# Patient Record
Sex: FEMALE | Race: WHITE | NOT HISPANIC OR LATINO | Employment: OTHER | URBAN - METROPOLITAN AREA
[De-identification: names, ages, dates, MRNs, and addresses within clinical notes are randomized per-mention and may not be internally consistent; named-entity substitution may affect disease eponyms.]

---

## 2023-08-31 PROBLEM — E04.2 NONTOXIC MULTINODULAR GOITER: Status: ACTIVE | Noted: 2023-08-31

## 2023-08-31 PROBLEM — M15.9 GENERALIZED OSTEOARTHRITIS: Status: ACTIVE | Noted: 2023-08-31

## 2023-08-31 PROBLEM — E03.9 ACQUIRED HYPOTHYROIDISM: Status: ACTIVE | Noted: 2023-08-31

## 2023-08-31 PROBLEM — E55.9 VITAMIN D DEFICIENCY: Status: ACTIVE | Noted: 2023-08-31

## 2023-08-31 PROBLEM — M54.50 LOW BACK PAIN AT MULTIPLE SITES: Status: ACTIVE | Noted: 2023-08-31

## 2023-08-31 PROBLEM — Z78.9 MEDICAL HOME PATIENT: Status: ACTIVE | Noted: 2023-08-31

## 2023-08-31 PROBLEM — I82.90 VENOUS THROMBOSIS: Status: ACTIVE | Noted: 2023-08-31

## 2023-08-31 PROBLEM — N95.9 MENOPAUSAL AND POSTMENOPAUSAL DISORDER: Status: ACTIVE | Noted: 2023-08-31

## 2023-08-31 PROBLEM — E06.3 AUTOIMMUNE THYROIDITIS: Status: ACTIVE | Noted: 2023-08-31

## 2023-08-31 PROBLEM — M19.90 OSTEOARTHRITIS: Status: ACTIVE | Noted: 2023-08-31

## 2023-08-31 PROBLEM — L71.9 ROSACEA: Status: ACTIVE | Noted: 2023-08-31

## 2023-08-31 PROBLEM — E66.9 OBESITY: Status: ACTIVE | Noted: 2023-08-31

## 2023-08-31 PROBLEM — M54.16 LUMBAR RADICULOPATHY, CHRONIC: Status: ACTIVE | Noted: 2023-08-31

## 2023-08-31 PROBLEM — I10 BENIGN HYPERTENSION: Status: ACTIVE | Noted: 2023-08-31

## 2023-08-31 RX ORDER — NAPROXEN SODIUM 220 MG/1
TABLET, FILM COATED ORAL
COMMUNITY
End: 2023-11-27 | Stop reason: ALTCHOICE

## 2023-08-31 RX ORDER — LISINOPRIL 20 MG/1
1 TABLET ORAL DAILY
COMMUNITY
End: 2024-04-15

## 2023-08-31 RX ORDER — CLOTRIMAZOLE AND BETAMETHASONE DIPROPIONATE 10; .5 MG/ML; MG/ML
LOTION TOPICAL EVERY 12 HOURS
COMMUNITY

## 2023-08-31 RX ORDER — ERGOCALCIFEROL 1.25 MG/1
CAPSULE ORAL
COMMUNITY
End: 2024-01-18 | Stop reason: SDUPTHER

## 2023-08-31 RX ORDER — METRONIDAZOLE 10 MG/G
1 GEL TOPICAL EVERY 24 HOURS
COMMUNITY
End: 2023-11-27 | Stop reason: ALTCHOICE

## 2023-08-31 RX ORDER — DEXTROMETHORPHAN HYDROBROMIDE, GUAIFENESIN 5; 100 MG/5ML; MG/5ML
LIQUID ORAL EVERY 8 HOURS
COMMUNITY
End: 2023-11-27 | Stop reason: ALTCHOICE

## 2023-08-31 RX ORDER — LEVOTHYROXINE SODIUM 75 UG/1
1 TABLET ORAL
COMMUNITY
End: 2023-10-24

## 2023-08-31 RX ORDER — HYDROCODONE BITARTRATE AND ACETAMINOPHEN 5; 325 MG/1; MG/1
TABLET ORAL
COMMUNITY
End: 2023-11-27 | Stop reason: ALTCHOICE

## 2023-10-24 DIAGNOSIS — E03.9 ACQUIRED HYPOTHYROIDISM: Primary | ICD-10-CM

## 2023-10-24 RX ORDER — LEVOTHYROXINE SODIUM 75 UG/1
75 TABLET ORAL
Qty: 90 TABLET | Refills: 3 | Status: SHIPPED | OUTPATIENT
Start: 2023-10-24

## 2023-11-27 ENCOUNTER — LAB (OUTPATIENT)
Dept: LAB | Facility: LAB | Age: 78
End: 2023-11-27
Payer: MEDICARE

## 2023-11-27 ENCOUNTER — OFFICE VISIT (OUTPATIENT)
Dept: PRIMARY CARE | Facility: CLINIC | Age: 78
End: 2023-11-27
Payer: MEDICARE

## 2023-11-27 VITALS
DIASTOLIC BLOOD PRESSURE: 78 MMHG | OXYGEN SATURATION: 99 % | WEIGHT: 184 LBS | SYSTOLIC BLOOD PRESSURE: 124 MMHG | HEIGHT: 62 IN | BODY MASS INDEX: 33.86 KG/M2 | HEART RATE: 69 BPM

## 2023-11-27 DIAGNOSIS — E55.9 VITAMIN D DEFICIENCY: ICD-10-CM

## 2023-11-27 DIAGNOSIS — R41.3 MEMORY LOSS: ICD-10-CM

## 2023-11-27 DIAGNOSIS — I82.90 VENOUS THROMBOSIS: ICD-10-CM

## 2023-11-27 DIAGNOSIS — L71.9 ROSACEA: ICD-10-CM

## 2023-11-27 DIAGNOSIS — M54.50 LOW BACK PAIN AT MULTIPLE SITES: ICD-10-CM

## 2023-11-27 DIAGNOSIS — R47.89 WORD FINDING DIFFICULTY: ICD-10-CM

## 2023-11-27 DIAGNOSIS — I10 BENIGN HYPERTENSION: Primary | ICD-10-CM

## 2023-11-27 DIAGNOSIS — E03.9 ACQUIRED HYPOTHYROIDISM: ICD-10-CM

## 2023-11-27 DIAGNOSIS — M54.16 LUMBAR RADICULOPATHY, CHRONIC: ICD-10-CM

## 2023-11-27 DIAGNOSIS — M15.9 GENERALIZED OSTEOARTHRITIS: ICD-10-CM

## 2023-11-27 DIAGNOSIS — Z12.11 SCREEN FOR COLON CANCER: ICD-10-CM

## 2023-11-27 PROBLEM — M19.90 OSTEOARTHRITIS: Status: RESOLVED | Noted: 2023-08-31 | Resolved: 2023-11-27

## 2023-11-27 PROBLEM — Z78.9 MEDICAL HOME PATIENT: Status: RESOLVED | Noted: 2023-08-31 | Resolved: 2023-11-27

## 2023-11-27 LAB — VIT B12 SERPL-MCNC: 974 PG/ML (ref 211–946)

## 2023-11-27 PROCEDURE — 82607 VITAMIN B-12: CPT

## 2023-11-27 PROCEDURE — 3074F SYST BP LT 130 MM HG: CPT | Performed by: INTERNAL MEDICINE

## 2023-11-27 PROCEDURE — 99214 OFFICE O/P EST MOD 30 MIN: CPT | Performed by: INTERNAL MEDICINE

## 2023-11-27 PROCEDURE — 36415 COLL VENOUS BLD VENIPUNCTURE: CPT

## 2023-11-27 PROCEDURE — 1036F TOBACCO NON-USER: CPT | Performed by: INTERNAL MEDICINE

## 2023-11-27 PROCEDURE — 1125F AMNT PAIN NOTED PAIN PRSNT: CPT | Performed by: INTERNAL MEDICINE

## 2023-11-27 PROCEDURE — 3078F DIAST BP <80 MM HG: CPT | Performed by: INTERNAL MEDICINE

## 2023-11-27 PROCEDURE — 1159F MED LIST DOCD IN RCRD: CPT | Performed by: INTERNAL MEDICINE

## 2023-11-27 PROCEDURE — 86780 TREPONEMA PALLIDUM: CPT

## 2023-11-27 PROCEDURE — 99214 OFFICE O/P EST MOD 30 MIN: CPT | Mod: 25 | Performed by: INTERNAL MEDICINE

## 2023-11-27 RX ORDER — ASPIRIN 325 MG
50000 TABLET, DELAYED RELEASE (ENTERIC COATED) ORAL
COMMUNITY
Start: 2015-07-17 | End: 2023-11-27 | Stop reason: SDUPTHER

## 2023-11-27 ASSESSMENT — ENCOUNTER SYMPTOMS
DEPRESSION: 0
DYSURIA: 0
OCCASIONAL FEELINGS OF UNSTEADINESS: 0
DIARRHEA: 0
ABDOMINAL PAIN: 0
SHORTNESS OF BREATH: 0
ACTIVITY CHANGE: 0
CARDIOVASCULAR NEGATIVE: 1
ARTHRALGIAS: 0
CONSTITUTIONAL NEGATIVE: 1
PSYCHIATRIC NEGATIVE: 1
COUGH: 0
CONSTIPATION: 0
LOSS OF SENSATION IN FEET: 0

## 2023-11-27 ASSESSMENT — PATIENT HEALTH QUESTIONNAIRE - PHQ9
1. LITTLE INTEREST OR PLEASURE IN DOING THINGS: NOT AT ALL
SUM OF ALL RESPONSES TO PHQ9 QUESTIONS 1 AND 2: 0
2. FEELING DOWN, DEPRESSED OR HOPELESS: NOT AT ALL

## 2023-11-27 ASSESSMENT — PAIN SCALES - GENERAL: PAINLEVEL: 4

## 2023-11-27 NOTE — PROGRESS NOTES
"Subjective   Patient ID: Nika Ricardo is a 78 y.o. female who presents for 4 MO FUV.    Hypertension-compliant and stable on current medications     Hypothyroid-stable on meds-last TSH   7/2023    Vitamin D def on supplements     Derm-did skin biopsy recently    Worried about dementia--\"losing words in conversation\", recipes conversions, got lost taking a different route. Denies harini car accidents or problems with money.  Positive h/o dementia in family: father, PGM (hardening of arteries in brain w/ CVA), mother-frontal-temporal dementia       Review of Systems   Constitutional: Negative.  Negative for activity change.   HENT:          Thinks ears w/ wax   Respiratory:  Negative for cough and shortness of breath.    Cardiovascular: Negative.    Gastrointestinal:  Negative for abdominal pain, constipation and diarrhea.        Due for cologuard   Genitourinary:  Negative for dysuria.   Musculoskeletal:  Negative for arthralgias.   Skin:  Negative for rash.   Psychiatric/Behavioral: Negative.         Objective   /78   Pulse 69   Ht 1.575 m (5' 2\")   Wt 83.5 kg (184 lb)   SpO2 99%   BMI 33.65 kg/m²     Physical Exam  Constitutional:       General: She is not in acute distress.     Appearance: Normal appearance.   HENT:      Ears:      Comments: Sm amt wax bilat w/o obstruction  Cardiovascular:      Rate and Rhythm: Normal rate and regular rhythm.      Heart sounds: Normal heart sounds. No murmur heard.     No gallop.   Pulmonary:      Breath sounds: Normal breath sounds. No wheezing, rhonchi or rales.   Skin:     General: Skin is warm and dry.      Findings: No rash.   Neurological:      General: No focal deficit present.      Mental Status: She is alert and oriented to person, place, and time.      Comments: 3/3 short term memory test   Psychiatric:         Mood and Affect: Mood normal.      Comments: Nervous about all this       Assessment/Plan   will start testing for her memory       Nika was seen " today for 4 mo fuv.  Diagnoses and all orders for this visit:  Benign hypertension (Primary)  Venous thrombosis  Acquired hypothyroidism  Vitamin D deficiency  Generalized osteoarthritis  Low back pain at multiple sites  Lumbar radiculopathy, chronic  Rosacea  Screen for colon cancer  -     Cologuard® colon cancer screening; Future  -     Cologuard® colon cancer screening  Memory loss  -     CT head wo IV contrast; Future  -     Vascular US carotid artery duplex bilateral; Future  -     Vitamin B12; Future  -     Syphilis Screen with Reflex; Future  Word finding difficulty  -     Vascular US carotid artery duplex bilateral; Future

## 2023-11-28 LAB — T PALLIDUM AB SER QL: NONREACTIVE

## 2023-12-07 ENCOUNTER — HOSPITAL ENCOUNTER (OUTPATIENT)
Dept: RADIOLOGY | Facility: HOSPITAL | Age: 78
Discharge: HOME | End: 2023-12-07
Payer: MEDICARE

## 2023-12-07 ENCOUNTER — CLINICAL SUPPORT (OUTPATIENT)
Dept: VASCULAR MEDICINE | Facility: CLINIC | Age: 78
End: 2023-12-07
Payer: MEDICARE

## 2023-12-07 DIAGNOSIS — R47.81 SLURRED SPEECH: ICD-10-CM

## 2023-12-07 DIAGNOSIS — R41.3 MEMORY LOSS: ICD-10-CM

## 2023-12-07 DIAGNOSIS — R47.89 WORD FINDING DIFFICULTY: ICD-10-CM

## 2023-12-07 PROCEDURE — 93880 EXTRACRANIAL BILAT STUDY: CPT

## 2023-12-07 PROCEDURE — 93880 EXTRACRANIAL BILAT STUDY: CPT | Performed by: INTERNAL MEDICINE

## 2023-12-07 PROCEDURE — 70450 CT HEAD/BRAIN W/O DYE: CPT

## 2023-12-13 LAB — NONINV COLON CA DNA+OCC BLD SCRN STL QL: NEGATIVE

## 2023-12-22 ENCOUNTER — OFFICE VISIT (OUTPATIENT)
Dept: PRIMARY CARE | Facility: CLINIC | Age: 78
End: 2023-12-22
Payer: MEDICARE

## 2023-12-22 VITALS
SYSTOLIC BLOOD PRESSURE: 120 MMHG | DIASTOLIC BLOOD PRESSURE: 74 MMHG | WEIGHT: 183 LBS | OXYGEN SATURATION: 98 % | BODY MASS INDEX: 33.68 KG/M2 | HEIGHT: 62 IN | HEART RATE: 77 BPM

## 2023-12-22 DIAGNOSIS — I67.89 CEREBRAL MICROVASCULAR DISEASE: Primary | ICD-10-CM

## 2023-12-22 DIAGNOSIS — I67.89 CEREBRAL MICROVASCULAR DISEASE: ICD-10-CM

## 2023-12-22 PROCEDURE — 1159F MED LIST DOCD IN RCRD: CPT | Performed by: INTERNAL MEDICINE

## 2023-12-22 PROCEDURE — 99214 OFFICE O/P EST MOD 30 MIN: CPT | Performed by: INTERNAL MEDICINE

## 2023-12-22 PROCEDURE — 3074F SYST BP LT 130 MM HG: CPT | Performed by: INTERNAL MEDICINE

## 2023-12-22 PROCEDURE — 3078F DIAST BP <80 MM HG: CPT | Performed by: INTERNAL MEDICINE

## 2023-12-22 PROCEDURE — 1125F AMNT PAIN NOTED PAIN PRSNT: CPT | Performed by: INTERNAL MEDICINE

## 2023-12-22 PROCEDURE — 1157F ADVNC CARE PLAN IN RCRD: CPT | Performed by: INTERNAL MEDICINE

## 2023-12-22 PROCEDURE — 1036F TOBACCO NON-USER: CPT | Performed by: INTERNAL MEDICINE

## 2023-12-22 RX ORDER — ATORVASTATIN CALCIUM 20 MG/1
20 TABLET, FILM COATED ORAL DAILY
Qty: 90 TABLET | OUTPATIENT
Start: 2023-12-22

## 2023-12-22 RX ORDER — ASPIRIN 81 MG/1
81 TABLET ORAL DAILY
Qty: 30 TABLET | Refills: 11 | Status: SHIPPED | OUTPATIENT
Start: 2023-12-22 | End: 2024-12-21

## 2023-12-22 RX ORDER — DICLOFENAC SODIUM 10 MG/G
4 GEL TOPICAL 4 TIMES DAILY PRN
COMMUNITY
End: 2023-12-22 | Stop reason: ALTCHOICE

## 2023-12-22 RX ORDER — ATORVASTATIN CALCIUM 20 MG/1
20 TABLET, FILM COATED ORAL DAILY
Qty: 30 TABLET | Refills: 11 | Status: SHIPPED | OUTPATIENT
Start: 2023-12-22 | End: 2023-12-22 | Stop reason: SDUPTHER

## 2023-12-22 ASSESSMENT — ENCOUNTER SYMPTOMS
DEPRESSION: 0
OCCASIONAL FEELINGS OF UNSTEADINESS: 0
LOSS OF SENSATION IN FEET: 0

## 2023-12-22 ASSESSMENT — PAIN SCALES - GENERAL: PAINLEVEL: 3

## 2023-12-22 NOTE — PROGRESS NOTES
"Subjective   Patient ID: Nika Ricardo is a 78 y.o. female who presents for ct results.    Here with     Reviewed nl labs, nl carotid US.  CT head with microvascular dz. 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  Discussed options--will try ASA and lipitor; ok to try otc brain builder such as prevegen and will re-eval             Objective   /74   Pulse 77   Ht 1.575 m (5' 2\")   Wt 83 kg (183 lb)   LMP  (LMP Unknown)   SpO2 98%   BMI 33.47 kg/m²         Assessment/Plan   Diagnoses and all orders for this visit:  Cerebral microvascular disease  -     atorvastatin (Lipitor) 20 mg tablet; Take 1 tablet (20 mg) by mouth once daily.  -     aspirin 81 mg EC tablet; Take 1 tablet (81 mg) by mouth once daily.  -     Lipid Panel; Future  Other orders  -     Follow Up In Primary Care - Established; Future         "

## 2024-01-02 ENCOUNTER — OFFICE VISIT (OUTPATIENT)
Dept: DERMATOLOGY | Facility: CLINIC | Age: 79
End: 2024-01-02
Payer: MEDICARE

## 2024-01-02 DIAGNOSIS — C44.01 BASAL CELL CARCINOMA (BCC) OF SKIN OF LIP: ICD-10-CM

## 2024-01-02 PROCEDURE — 17311 MOHS 1 STAGE H/N/HF/G: CPT | Performed by: DERMATOLOGY

## 2024-01-02 PROCEDURE — 1157F ADVNC CARE PLAN IN RCRD: CPT | Performed by: DERMATOLOGY

## 2024-01-02 PROCEDURE — 99204 OFFICE O/P NEW MOD 45 MIN: CPT | Performed by: DERMATOLOGY

## 2024-01-02 PROCEDURE — 17312 MOHS ADDL STAGE: CPT | Performed by: DERMATOLOGY

## 2024-01-02 PROCEDURE — 13131 CMPLX RPR F/C/C/M/N/AX/G/H/F: CPT | Performed by: DERMATOLOGY

## 2024-01-02 PROCEDURE — 1159F MED LIST DOCD IN RCRD: CPT | Performed by: DERMATOLOGY

## 2024-01-02 PROCEDURE — 1036F TOBACCO NON-USER: CPT | Performed by: DERMATOLOGY

## 2024-01-02 PROCEDURE — 1125F AMNT PAIN NOTED PAIN PRSNT: CPT | Performed by: DERMATOLOGY

## 2024-01-02 NOTE — PROGRESS NOTES
Mohs Surgery Operative Note    Date of Surgery:  1/2/2024  Surgeon:  Alyce Frankel MD  Office Location:  [address] [facility]  [facility]  [address]  [facility] [address]  Dept: [phone]  Dept Fax: [phone]  Referring Provider: Sari Hernandez MD  [address] [place] [address] [facility]  [address]  [place] [address]      Assessment/Plan   Pre-procedure:   Obtained informed consent: written from patient  The surgical site was identified and confirmed with the patient.     Intra-operative:   Audible time out called at : 8:57 AM 01/02/24  by: Dayana Ridley MA   Verified patient name, birthdate, site, specimen bottle label & requisition.    The planned procedure(s) was again reviewed with the patient. The risks of bleeding, infection, nerve damage and scarring were reviewed. Written authorization was obtained. The patient identity, surgical site, and planned procedure(s) were verified. The provider acted as both surgeon and pathologist.     Basal cell carcinoma (BCC) of skin of lip  Right Upper Cutaneous Lip  Mohs surgery  Consent obtained: written    Universal Protocol:  Procedure explained and questions answered to patient or proxy's satisfaction: Yes    Test results available and properly labeled: Yes    Pathology report reviewed: Yes    External notes reviewed: Yes    Photo or diagram used for site identification: Yes    Site/side marked: Yes    Slide independently reviewed by Mohs surgeon: Yes    Immediately prior to procedure a time out was called: Yes    Patient identity confirmed: verbally with patient  Preparation: Patient was prepped and draped in usual sterile fashion      Anticoagulation:  Is the patient taking prescription anticoagulant and/or aspirin prescribed/recommended by a physician? Yes    Was the anticoagulation regimen changed prior to Mohs? No      Anesthesia:  Anesthesia method: local infiltration  Local anesthetic: lidocaine 2% WITH  epi    Procedure Details:  Biopsy accession number: [id number]  Date of biopsy: 11/16/2023  Pre-Op diagnosis: basal cell carcinoma  BCC subtype: nodular and micronodular  Surgery side: right  Surgical site (from skin exam): Right Upper Cutaneous Lip  Pre-operative length (cm): 0.4  Pre-operative width (cm): 0.5  Indications for Mohs surgery: anatomic location where tissue conservation is critical  Previously treated? No      Micrographic Surgery Details:  Post-operative length (cm): 0.8  Post-operative width (cm): 0.8  Number of Mohs stages: 2    Stage 1     Comments: The patient was brought into the operating room and placed in the procedure chair in the appropriate position.  The area positive by previous biopsy was identified and confirmed with the patient. The area of clinically obvious tumor was debulked using a curette and/or scalpel as needed. An incision was made following the Mohs approach through the skin. The specimen was taken to the lab, divided into 2 piece(s) and appropriately chromacoded and processed.  -Tumor was seen on the lateral margins as indicated on the on the Mohs map.     Tumor features identified on Mohs section: basal carcinoma      Depth of invasion: dermis    Stage 2     Comments: The area of positivity as noted on the Mohs map in the previous stage was identified and removed using the Mohs technique. The specimen was taken to the lab and appropriately chromacoded and processed in 1 piece(s).     Tumor features identified on Mohs section: no tumor identified    Depth of defect: subcutaneous fat    Patient tolerance of procedure: tolerated well, no immediate complications    Reconstruction:  Was the defect reconstructed? Yes    Was reconstruction performed by the same Mohs surgeon? Yes    Setting of reconstruction: outpatient office  When was reconstruction performed? same day  Type of reconstruction: linear  Linear reconstruction: complex  Length of linear repair (cm):  1.8  Subcutaneous Layers (Deep Stitches)   Suture size:  5-0  Suture type:  Vicryl  Stitches:  Buried vertical mattress  Fine/surface layer approximation (top stitches)   Epidermal/Superficial suture size:  5-0  Epidermal/Superficial suture type:  Fast-absorbing gut  Stitches: simple running    Hemostasis achieved with: suture and pressure  Outcome: patient tolerated procedure well with no complications    Post-procedure details: wound care instructions given      Complex Linear Repair - Wide Undermining:  Given the location and size of the defect, it was determined that a complex layered linear closure was required to restore normal anatomy and function. The repair was considered complex because extensive undermining was required and performed. The amount of undermining performed was greater than the maximum width of the defect as measured perpendicular to the closure line along at least one entire edge of the defect. Standing cutaneous cones were removed using Burow's triangles. The wound edges were brought into close approximation with buried vertical mattress sutures. The remainder of the wound was then closed with epidermal top sutures.  The final repair measured 1.8 cm                Wound care was discussed, and the patient was given written post-operative wound care instructions.      The patient will follow up with Alyce Frankel MD as needed for any post operative problems or concerns, and will follow up with their primary dermatologist as scheduled.

## 2024-01-02 NOTE — LETTER
January 25, 2024     Sari Hernandez MD  [address]  [facility] [place] [id number]    Patient: Nika Ricardo   YOB: 1945   Date of Visit: 1/2/2024       Dear Dr. Sari Hernandez MD:    Thank you for referring Nika Ricardo to me for evaluation. Below are my notes for this consultation.  If you have questions, please do not hesitate to call me. I look forward to following your patient along with you.       Sincerely,     Alyce Frankel MD      CC: No Recipients  ______________________________________________________________________________________    Mohs Surgery Operative Note    Date of Surgery:  1/2/2024  Surgeon:  Alyce Frankel MD  Office Location: [address]  [facility] [address]  Dept: [phone]  Dept Fax: [phone]  Referring Provider: Sari Hernandez MD  [address] [place] [facility] [place] [facility] [place] [address]      Assessment/Plan  Pre-procedure:   Obtained informed consent: written from patient  The surgical site was identified and confirmed with the patient.     Intra-operative:   Audible time out called at : 8:57 AM 01/02/24  by: Dayana Ridley MA   Verified patient name, birthdate, site, specimen bottle label & requisition.    The planned procedure(s) was again reviewed with the patient. The risks of bleeding, infection, nerve damage and scarring were reviewed. Written authorization was obtained. The patient identity, surgical site, and planned procedure(s) were verified. The provider acted as both surgeon and pathologist.     Basal cell carcinoma (BCC) of skin of lip  Right Upper Cutaneous Lip  Mohs surgery  Consent obtained: written    Universal Protocol:  Procedure explained and questions answered to patient or proxy's satisfaction: Yes    Test results available and properly labeled: Yes    Pathology report reviewed: Yes    External notes  reviewed: Yes    Photo or diagram used for site identification: Yes    Site/side marked: Yes    Slide independently reviewed by Mohs surgeon: Yes    Immediately prior to procedure a time out was called: Yes    Patient identity confirmed: verbally with patient  Preparation: Patient was prepped and draped in usual sterile fashion      Anticoagulation:  Is the patient taking prescription anticoagulant and/or aspirin prescribed/recommended by a physician? Yes    Was the anticoagulation regimen changed prior to Mohs? No      Anesthesia:  Anesthesia method: local infiltration  Local anesthetic: lidocaine 2% WITH epi    Procedure Details:  Biopsy accession number: [id number]  Date of biopsy: 11/16/2023  Pre-Op diagnosis: basal cell carcinoma  BCC subtype: nodular and micronodular  Surgery side: right  Surgical site (from skin exam): Right Upper Cutaneous Lip  Pre-operative length (cm): 0.4  Pre-operative width (cm): 0.5  Indications for Mohs surgery: anatomic location where tissue conservation is critical  Previously treated? No      Micrographic Surgery Details:  Post-operative length (cm): 0.8  Post-operative width (cm): 0.8  Number of Mohs stages: 2    Stage 1     Comments: The patient was brought into the operating room and placed in the procedure chair in the appropriate position.  The area positive by previous biopsy was identified and confirmed with the patient. The area of clinically obvious tumor was debulked using a curette and/or scalpel as needed. An incision was made following the Mohs approach through the skin. The specimen was taken to the lab, divided into 2 piece(s) and appropriately chromacoded and processed.  -Tumor was seen on the lateral margins as indicated on the on the Mohs map.     Tumor features identified on Mohs section: basal carcinoma      Depth of invasion: dermis    Stage 2     Comments: The area of positivity as noted on the Mohs map in the previous stage was identified and removed  using the Mohs technique. The specimen was taken to the lab and appropriately chromacoded and processed in 1 piece(s).     Tumor features identified on Mohs section: no tumor identified    Depth of defect: subcutaneous fat    Patient tolerance of procedure: tolerated well, no immediate complications    Reconstruction:  Was the defect reconstructed? Yes    Was reconstruction performed by the same Mohs surgeon? Yes    Setting of reconstruction: outpatient office  When was reconstruction performed? same day  Type of reconstruction: linear  Linear reconstruction: complex  Length of linear repair (cm): 1.8  Subcutaneous Layers (Deep Stitches)   Suture size:  5-0  Suture type:  Vicryl  Stitches:  Buried vertical mattress  Fine/surface layer approximation (top stitches)   Epidermal/Superficial suture size:  5-0  Epidermal/Superficial suture type:  Fast-absorbing gut  Stitches: simple running    Hemostasis achieved with: suture and pressure  Outcome: patient tolerated procedure well with no complications    Post-procedure details: wound care instructions given      Complex Linear Repair - Wide Undermining:  Given the location and size of the defect, it was determined that a complex layered linear closure was required to restore normal anatomy and function. The repair was considered complex because extensive undermining was required and performed. The amount of undermining performed was greater than the maximum width of the defect as measured perpendicular to the closure line along at least one entire edge of the defect. Standing cutaneous cones were removed using Burow's triangles. The wound edges were brought into close approximation with buried vertical mattress sutures. The remainder of the wound was then closed with epidermal top sutures.  The final repair measured 1.8 cm                Wound care was discussed, and the patient was given written post-operative wound care instructions.      The patient will follow up  with Alyce Frankel MD as needed for any post operative problems or concerns, and will follow up with their primary dermatologist as scheduled.        Office Visit Note  Date: 1/2/2024  Surgeon:  Alyce Frankel MD  Office Location:  [address] [facility]  [address]  [address]  [facility] [address]  Dept: [phone]  Dept Fax: [phone]  Referring Provider: Sari Hernandez MD  [address] [place] [address] [facility]  [address]  [place] [address]    Nishant Ricardo is a 78 y.o. female who presents for the following: MOHS Surgery (Right Upper Cutaneous Lip - )    According to the patient, the lesion has been presnt for approximately 6 months at the time of diagnosis.  The lesion is not causing symptoms.  The lesion has not been treated previously.    The patient does not have a pacemaker / defibrillator.  The patient does have a heart valve / joint replacement.    The patient is on blood thinners.  The patient does not have a history of hepatitis B or C.  The patient does not have a history of HIV.  The patient does not have a history of immunosuppression (e.g. organ transplantation, malignancy, medications)    Review of Systems:  No other skin or systemic complaints other than what is documented elsewhere in the note.    MEDICAL HISTORY: clinically relevant history including significant past medical history, medications and allergies was reviewed and documented in Epic.    Objective  Well appearing patient in no apparent distress; mood and affect are within normal limits.  Vital signs: See record.  Noted on the Right Upper Cutaneous Lip  Is a 0.4 x 0.5 cm scar    The patient confirmed the identified site.    Discussion:  The nature of the diagnosis was explained. The lesion is a skin cancer.  It has a risk of local growth and distant spread. The condition is associated with sun exposure.  Warning signs of non-melanoma skin cancer discussed. Patient was  instructed to perform monthly self skin examination.  We recommended that the patient have regular full skin exams given an increased risk of subsequent skin cancers. The patient was instructed to use sun protective behaviors including use of broad spectrum sunscreens and sun protective clothing to reduce risk of skin cancers.      Risks, benefits, side effects of Mohs surgery were discussed with patient and the patient voiced understanding.  It was explained that even though the cure rate of Mohs is very high it is not 100%. Risks of surgery including but not limited to bleeding, infection, numbness, nerve damage, and scar were reviewed.  Discussion included wound care requirements, activity restrictions, likely scar outcome and time to heal.     After Mohs surgery, the defect may need to be repaired surgically and the scar may be longer than the original lesion.  Reconstruction options, risks, and benefits were reviewed including second intention healing, linear repair (4-1 ratio was explained), local flaps, skin grafts, cartilage grafts and interpolation flaps (the need for multiple surgeries was explained). Possible outcomes were reviewed including likely scar appearance, failure of flap survival, infection, bleeding and the need for revision surgery.     The pathology was reviewed, the photograph was reviewed, and the referring physician's note was reviewed.    Patient elected for Mohs surgery.

## 2024-01-02 NOTE — PROGRESS NOTES
Office Visit Note  Date: 1/2/2024  Surgeon:  Alyce Frankel MD  Office Location: DO [address] [facility]  [address]  [address]  [facility] [address]  Dept: [phone]  Dept Fax: [phone]  Referring Provider: Sari Hernandez MD  [address] [place] [address] [facility]  [address]  [place] [address]    Nishant Ricardo is a 78 y.o. female who presents for the following: MOHS Surgery (Right Upper Cutaneous Lip - )    According to the patient, the lesion has been presnt for approximately 6 months at the time of diagnosis.  The lesion is not causing symptoms.  The lesion has not been treated previously.    The patient does not have a pacemaker / defibrillator.  The patient does have a heart valve / joint replacement.    The patient is on blood thinners.  The patient does not have a history of hepatitis B or C.  The patient does not have a history of HIV.  The patient does not have a history of immunosuppression (e.g. organ transplantation, malignancy, medications)    Review of Systems:  No other skin or systemic complaints other than what is documented elsewhere in the note.    MEDICAL HISTORY: clinically relevant history including significant past medical history, medications and allergies was reviewed and documented in Epic.    Objective   Well appearing patient in no apparent distress; mood and affect are within normal limits.  Vital signs: See record.  Noted on the Right Upper Cutaneous Lip  Is a 0.4 x 0.5 cm scar    The patient confirmed the identified site.    Discussion:  The nature of the diagnosis was explained. The lesion is a skin cancer.  It has a risk of local growth and distant spread. The condition is associated with sun exposure.  Warning signs of non-melanoma skin cancer discussed. Patient was instructed to perform monthly self skin examination.  We recommended that the patient have regular full skin exams given an increased risk of subsequent  skin cancers. The patient was instructed to use sun protective behaviors including use of broad spectrum sunscreens and sun protective clothing to reduce risk of skin cancers.      Risks, benefits, side effects of Mohs surgery were discussed with patient and the patient voiced understanding.  It was explained that even though the cure rate of Mohs is very high it is not 100%. Risks of surgery including but not limited to bleeding, infection, numbness, nerve damage, and scar were reviewed.  Discussion included wound care requirements, activity restrictions, likely scar outcome and time to heal.     After Mohs surgery, the defect may need to be repaired surgically and the scar may be longer than the original lesion.  Reconstruction options, risks, and benefits were reviewed including second intention healing, linear repair (4-1 ratio was explained), local flaps, skin grafts, cartilage grafts and interpolation flaps (the need for multiple surgeries was explained). Possible outcomes were reviewed including likely scar appearance, failure of flap survival, infection, bleeding and the need for revision surgery.     The pathology was reviewed, the photograph was reviewed, and the referring physician's note was reviewed.    Patient elected for Mohs surgery.

## 2024-01-09 ENCOUNTER — TELEPHONE (OUTPATIENT)
Dept: DERMATOLOGY | Facility: CLINIC | Age: 79
End: 2024-01-09
Payer: MEDICARE

## 2024-01-09 NOTE — TELEPHONE ENCOUNTER
Pt called stating that she still has clear (top) sutures on her surgical site form 1/2/24 with Dr. Frankel on her upper lip. Pt informed that she can lightly pull at the clear suture to help it come off, pt was hesitant with this option. Pt advised to continue to wash, Vaseline and bandage the area for 3 more days to help the suture dissolve, she can call our office Friday or Monday if the stitches are still present.

## 2024-01-15 ENCOUNTER — TELEPHONE (OUTPATIENT)
Dept: DERMATOLOGY | Facility: CLINIC | Age: 79
End: 2024-01-15
Payer: MEDICARE

## 2024-01-15 NOTE — TELEPHONE ENCOUNTER
Patient called with concerns of bumps along stitch line that are not painful or red. She is using the vaseline and would like the bumps checked out by Dr. Frankel. Nika will come in tomorrow to be assessed.

## 2024-01-16 ENCOUNTER — OFFICE VISIT (OUTPATIENT)
Dept: DERMATOLOGY | Facility: CLINIC | Age: 79
End: 2024-01-16
Payer: MEDICARE

## 2024-01-16 DIAGNOSIS — Z51.89 VISIT FOR WOUND CHECK: Primary | ICD-10-CM

## 2024-01-16 PROCEDURE — 1159F MED LIST DOCD IN RCRD: CPT | Performed by: DERMATOLOGY

## 2024-01-16 PROCEDURE — 1125F AMNT PAIN NOTED PAIN PRSNT: CPT | Performed by: DERMATOLOGY

## 2024-01-16 PROCEDURE — 1036F TOBACCO NON-USER: CPT | Performed by: DERMATOLOGY

## 2024-01-16 PROCEDURE — 99024 POSTOP FOLLOW-UP VISIT: CPT | Performed by: DERMATOLOGY

## 2024-01-16 NOTE — PROGRESS NOTES
Office Follow Up Note    Visit Summary  Chief Complaint    1. Complaint Wound check.    Nika Ricardo is a 78 y.o. female who presents for 2 week follow up after surgery for a basal cell carcinoma. The patient has concerns of bumpiness    Location Operation site location: Right Upper Cutaneous Lip    On exam,  Ms. Ricardo is well-appearing and in no apparent distress. The surgical site appears clean with minimal to no erythema. No tenderness and good wound edge apposition.    Assessment and Plan:  Discussed normal scar healing  History of skin cancer requiring ongoing monitoring for recurrence and additional lesion development.   The patient was reassured that the wound is healing appropriately.   Okay to start gentle massage    The patient was advised on the importance of sun protection and routine skin monitoring and instructed to call with any further concerns. The patient will return as needed.    Alyce Frankel MD

## 2024-01-18 ENCOUNTER — TELEPHONE (OUTPATIENT)
Dept: PRIMARY CARE | Facility: CLINIC | Age: 79
End: 2024-01-18
Payer: MEDICARE

## 2024-01-18 DIAGNOSIS — E55.9 VITAMIN D DEFICIENCY: ICD-10-CM

## 2024-01-18 RX ORDER — ERGOCALCIFEROL 1.25 MG/1
CAPSULE ORAL
Qty: 6 CAPSULE | Refills: 3 | Status: SHIPPED | OUTPATIENT
Start: 2024-01-18

## 2024-02-20 ENCOUNTER — OFFICE VISIT (OUTPATIENT)
Dept: PRIMARY CARE | Facility: CLINIC | Age: 79
End: 2024-02-20
Payer: MEDICARE

## 2024-02-20 VITALS
BODY MASS INDEX: 34.04 KG/M2 | DIASTOLIC BLOOD PRESSURE: 72 MMHG | WEIGHT: 185 LBS | SYSTOLIC BLOOD PRESSURE: 116 MMHG | HEIGHT: 62 IN

## 2024-02-20 DIAGNOSIS — H61.23 IMPACTED CERUMEN, BILATERAL: Primary | ICD-10-CM

## 2024-02-20 PROBLEM — K59.00 CONSTIPATION, UNSPECIFIED: Status: ACTIVE | Noted: 2024-02-20

## 2024-02-20 PROBLEM — M16.11 ARTHRITIS OF RIGHT HIP: Status: ACTIVE | Noted: 2018-12-27

## 2024-02-20 PROBLEM — R47.89 WORD FINDING DIFFICULTY: Status: ACTIVE | Noted: 2024-02-20

## 2024-02-20 PROBLEM — Z78.9 MEDICAL HOME PATIENT: Status: ACTIVE | Noted: 2024-02-20

## 2024-02-20 PROBLEM — M25.551 PAIN IN RIGHT HIP: Status: ACTIVE | Noted: 2024-02-20

## 2024-02-20 PROBLEM — M18.0 PRIMARY OSTEOARTHRITIS OF BOTH FIRST CARPOMETACARPAL JOINTS: Status: ACTIVE | Noted: 2017-01-24

## 2024-02-20 PROBLEM — M43.10 DEGENERATIVE SPONDYLOLISTHESIS: Status: ACTIVE | Noted: 2018-10-19

## 2024-02-20 PROBLEM — L72.0 EPIDERMAL CYST: Status: ACTIVE | Noted: 2022-01-18

## 2024-02-20 PROBLEM — R41.3 AMNESIA: Status: ACTIVE | Noted: 2023-11-27

## 2024-02-20 PROCEDURE — 1126F AMNT PAIN NOTED NONE PRSNT: CPT | Performed by: INTERNAL MEDICINE

## 2024-02-20 PROCEDURE — 3078F DIAST BP <80 MM HG: CPT | Performed by: INTERNAL MEDICINE

## 2024-02-20 PROCEDURE — 99212 OFFICE O/P EST SF 10 MIN: CPT | Performed by: INTERNAL MEDICINE

## 2024-02-20 PROCEDURE — 1159F MED LIST DOCD IN RCRD: CPT | Performed by: INTERNAL MEDICINE

## 2024-02-20 PROCEDURE — 1158F ADVNC CARE PLAN TLK DOCD: CPT | Performed by: INTERNAL MEDICINE

## 2024-02-20 PROCEDURE — 1123F ACP DISCUSS/DSCN MKR DOCD: CPT | Performed by: INTERNAL MEDICINE

## 2024-02-20 PROCEDURE — 3074F SYST BP LT 130 MM HG: CPT | Performed by: INTERNAL MEDICINE

## 2024-02-20 PROCEDURE — 1157F ADVNC CARE PLAN IN RCRD: CPT | Performed by: INTERNAL MEDICINE

## 2024-02-20 PROCEDURE — 1036F TOBACCO NON-USER: CPT | Performed by: INTERNAL MEDICINE

## 2024-02-20 ASSESSMENT — PAIN SCALES - GENERAL: PAINLEVEL: 0-NO PAIN

## 2024-02-20 ASSESSMENT — PATIENT HEALTH QUESTIONNAIRE - PHQ9
SUM OF ALL RESPONSES TO PHQ9 QUESTIONS 1 AND 2: 0
2. FEELING DOWN, DEPRESSED OR HOPELESS: NOT AT ALL
1. LITTLE INTEREST OR PLEASURE IN DOING THINGS: NOT AT ALL

## 2024-02-20 ASSESSMENT — ENCOUNTER SYMPTOMS
OCCASIONAL FEELINGS OF UNSTEADINESS: 0
DEPRESSION: 0
LOSS OF SENSATION IN FEET: 0

## 2024-02-20 NOTE — PROGRESS NOTES
"Subjective   Patient ID: Nika Ricardo is a 78 y.o. female who presents for blocked ears.    Ears blocked again-had been doing drops 1-2x/week but though was doing ok so backed off to every other week or so. Last 2 weeks can't hear on R and intermittent on left. No pain or drainage             Objective   /72   Ht 1.575 m (5' 2\")   Wt 83.9 kg (185 lb)   LMP  (LMP Unknown)   BMI 33.84 kg/m²     Physical Exam  HENT:      Right Ear: There is impacted cerumen.      Left Ear: There is impacted cerumen.         Assessment/Plan   Diagnoses and all orders for this visit:  Impacted cerumen, bilateral  -     Ear cerumen removal    Instructed by nurse in use of wax drops after successfully clearing wax     "

## 2024-03-22 ENCOUNTER — OFFICE VISIT (OUTPATIENT)
Dept: PRIMARY CARE | Facility: CLINIC | Age: 79
End: 2024-03-22
Payer: MEDICARE

## 2024-03-22 ENCOUNTER — LAB (OUTPATIENT)
Dept: LAB | Facility: LAB | Age: 79
End: 2024-03-22
Payer: MEDICARE

## 2024-03-22 VITALS
OXYGEN SATURATION: 97 % | BODY MASS INDEX: 33.49 KG/M2 | DIASTOLIC BLOOD PRESSURE: 76 MMHG | HEIGHT: 62 IN | WEIGHT: 182 LBS | HEART RATE: 71 BPM | SYSTOLIC BLOOD PRESSURE: 124 MMHG

## 2024-03-22 DIAGNOSIS — M54.16 LUMBAR RADICULOPATHY, CHRONIC: ICD-10-CM

## 2024-03-22 DIAGNOSIS — H61.21 RIGHT EAR IMPACTED CERUMEN: ICD-10-CM

## 2024-03-22 DIAGNOSIS — E04.2 NONTOXIC MULTINODULAR GOITER: ICD-10-CM

## 2024-03-22 DIAGNOSIS — E06.3 AUTOIMMUNE THYROIDITIS: ICD-10-CM

## 2024-03-22 DIAGNOSIS — I67.89 CEREBRAL MICROVASCULAR DISEASE: ICD-10-CM

## 2024-03-22 DIAGNOSIS — M54.50 LOW BACK PAIN AT MULTIPLE SITES: ICD-10-CM

## 2024-03-22 DIAGNOSIS — I67.89 CEREBRAL MICROVASCULAR DISEASE: Primary | ICD-10-CM

## 2024-03-22 DIAGNOSIS — M15.9 GENERALIZED OSTEOARTHRITIS: ICD-10-CM

## 2024-03-22 DIAGNOSIS — E03.8 HYPOTHYROIDISM DUE TO HASHIMOTO'S THYROIDITIS: ICD-10-CM

## 2024-03-22 DIAGNOSIS — I10 BENIGN HYPERTENSION: ICD-10-CM

## 2024-03-22 DIAGNOSIS — E55.9 VITAMIN D DEFICIENCY: ICD-10-CM

## 2024-03-22 DIAGNOSIS — E66.09 CLASS 1 OBESITY DUE TO EXCESS CALORIES WITH SERIOUS COMORBIDITY AND BODY MASS INDEX (BMI) OF 33.0 TO 33.9 IN ADULT: ICD-10-CM

## 2024-03-22 DIAGNOSIS — E06.3 HYPOTHYROIDISM DUE TO HASHIMOTO'S THYROIDITIS: ICD-10-CM

## 2024-03-22 PROBLEM — C44.01 BASAL CELL CARCINOMA (BCC) OF SKIN OF LIP: Status: ACTIVE | Noted: 2024-03-22

## 2024-03-22 LAB
CHOLEST SERPL-MCNC: 140 MG/DL (ref 133–200)
CHOLEST/HDLC SERPL: 2 {RATIO}
HDLC SERPL-MCNC: 70 MG/DL
LDLC SERPL CALC-MCNC: 58 MG/DL (ref 65–130)
TRIGL SERPL-MCNC: 60 MG/DL (ref 40–150)

## 2024-03-22 PROCEDURE — 1159F MED LIST DOCD IN RCRD: CPT | Performed by: INTERNAL MEDICINE

## 2024-03-22 PROCEDURE — 1036F TOBACCO NON-USER: CPT | Performed by: INTERNAL MEDICINE

## 2024-03-22 PROCEDURE — 99214 OFFICE O/P EST MOD 30 MIN: CPT | Performed by: INTERNAL MEDICINE

## 2024-03-22 PROCEDURE — 69210 REMOVE IMPACTED EAR WAX UNI: CPT | Performed by: INTERNAL MEDICINE

## 2024-03-22 PROCEDURE — 36415 COLL VENOUS BLD VENIPUNCTURE: CPT

## 2024-03-22 PROCEDURE — 1125F AMNT PAIN NOTED PAIN PRSNT: CPT | Performed by: INTERNAL MEDICINE

## 2024-03-22 PROCEDURE — 3078F DIAST BP <80 MM HG: CPT | Performed by: INTERNAL MEDICINE

## 2024-03-22 PROCEDURE — 1123F ACP DISCUSS/DSCN MKR DOCD: CPT | Performed by: INTERNAL MEDICINE

## 2024-03-22 PROCEDURE — 3074F SYST BP LT 130 MM HG: CPT | Performed by: INTERNAL MEDICINE

## 2024-03-22 PROCEDURE — 80061 LIPID PANEL: CPT

## 2024-03-22 PROCEDURE — 1157F ADVNC CARE PLAN IN RCRD: CPT | Performed by: INTERNAL MEDICINE

## 2024-03-22 ASSESSMENT — ENCOUNTER SYMPTOMS
COUGH: 0
CONSTIPATION: 0
PSYCHIATRIC NEGATIVE: 1
DIARRHEA: 0
ABDOMINAL PAIN: 0
OCCASIONAL FEELINGS OF UNSTEADINESS: 0
DYSURIA: 0
LOSS OF SENSATION IN FEET: 0
ACTIVITY CHANGE: 0
CONSTITUTIONAL NEGATIVE: 1
ARTHRALGIAS: 0
SHORTNESS OF BREATH: 0
CARDIOVASCULAR NEGATIVE: 1
DEPRESSION: 0

## 2024-03-22 ASSESSMENT — PAIN SCALES - GENERAL: PAINLEVEL: 3

## 2024-03-22 NOTE — PROGRESS NOTES
"Subjective   Patient ID: Nika Ricardo is a 78 y.o. female who presents for Follow-up.    Cerebral microvascular disease on CT scan with mild cognitive impairment; family history of dementia; currently trying prevagen    Hypertension-compliant and stable on current medications     Hyperlipidemia-stable and compliant on meds. Taking atorvastatin-tolerating it so far. Lab Results       Component                Value               Date                       LDLCALC                  58 (L)              03/22/2024               Hypothyroid with thyroiditis and goiter-stable on meds-last TSH   7/2023    Vitamin D def on supplements              Review of Systems   Constitutional: Negative.  Negative for activity change.   Respiratory:  Negative for cough and shortness of breath.    Cardiovascular: Negative.    Gastrointestinal:  Negative for abdominal pain, constipation and diarrhea.        Cologuard negative   Genitourinary:  Negative for dysuria.   Musculoskeletal:  Negative for arthralgias.   Skin:  Negative for rash.   Neurological:         Word finding difficulty persists   Psychiatric/Behavioral: Negative.         Objective   /76   Pulse 71   Ht 1.575 m (5' 2\")   Wt 82.6 kg (182 lb)   LMP  (LMP Unknown)   SpO2 97%   BMI 33.29 kg/m²     Physical Exam  Constitutional:       General: She is not in acute distress.     Appearance: Normal appearance.   HENT:      Right Ear: There is impacted cerumen.      Left Ear: Tympanic membrane normal.   Cardiovascular:      Rate and Rhythm: Normal rate and regular rhythm.      Heart sounds: Normal heart sounds. No murmur heard.     No gallop.   Pulmonary:      Breath sounds: Normal breath sounds. No wheezing, rhonchi or rales.   Skin:     General: Skin is warm and dry.      Findings: No rash.   Neurological:      General: No focal deficit present.      Mental Status: She is alert and oriented to person, place, and time.      Comments: 3/3 short term memory test "   Psychiatric:         Mood and Affect: Mood normal.      Comments: Nervous about all this         Assessment/Plan   Diagnoses and all orders for this visit:  Cerebral microvascular disease  Benign hypertension  Autoimmune thyroiditis  Hypothyroidism due to Hashimoto's thyroiditis  Nontoxic multinodular goiter  Class 1 obesity due to excess calories with serious comorbidity and body mass index (BMI) of 33.0 to 33.9 in adult  Vitamin D deficiency  Generalized osteoarthritis  Low back pain at multiple sites  Lumbar radiculopathy, chronic  Right ear impacted cerumen  -     Ear Cerumen Removal  Other orders  -     Follow Up In Primary Care - Established; Future

## 2024-04-13 DIAGNOSIS — I10 BENIGN HYPERTENSION: Primary | ICD-10-CM

## 2024-04-15 RX ORDER — LISINOPRIL 20 MG/1
20 TABLET ORAL DAILY
Qty: 90 TABLET | Refills: 3 | Status: SHIPPED | OUTPATIENT
Start: 2024-04-15

## 2024-05-03 ENCOUNTER — OFFICE VISIT (OUTPATIENT)
Dept: PRIMARY CARE | Facility: CLINIC | Age: 79
End: 2024-05-03
Payer: MEDICARE

## 2024-05-03 VITALS
DIASTOLIC BLOOD PRESSURE: 64 MMHG | TEMPERATURE: 97.9 F | WEIGHT: 182.7 LBS | OXYGEN SATURATION: 97 % | BODY MASS INDEX: 33.42 KG/M2 | SYSTOLIC BLOOD PRESSURE: 120 MMHG | HEART RATE: 72 BPM

## 2024-05-03 DIAGNOSIS — N39.0 ACUTE UTI: Primary | ICD-10-CM

## 2024-05-03 LAB
POC APPEARANCE, URINE: CLEAR
POC BILIRUBIN, URINE: NEGATIVE
POC BLOOD, URINE: ABNORMAL
POC COLOR, URINE: ABNORMAL
POC GLUCOSE, URINE: NEGATIVE MG/DL
POC KETONES, URINE: ABNORMAL MG/DL
POC LEUKOCYTES, URINE: ABNORMAL
POC NITRITE,URINE: NEGATIVE
POC PH, URINE: 6 PH
POC PROTEIN, URINE: NEGATIVE MG/DL
POC SPECIFIC GRAVITY, URINE: 1.02
POC UROBILINOGEN, URINE: 1 EU/DL

## 2024-05-03 PROCEDURE — 1123F ACP DISCUSS/DSCN MKR DOCD: CPT | Performed by: INTERNAL MEDICINE

## 2024-05-03 PROCEDURE — 1036F TOBACCO NON-USER: CPT | Performed by: INTERNAL MEDICINE

## 2024-05-03 PROCEDURE — 1157F ADVNC CARE PLAN IN RCRD: CPT | Performed by: INTERNAL MEDICINE

## 2024-05-03 PROCEDURE — 1125F AMNT PAIN NOTED PAIN PRSNT: CPT | Performed by: INTERNAL MEDICINE

## 2024-05-03 PROCEDURE — 1159F MED LIST DOCD IN RCRD: CPT | Performed by: INTERNAL MEDICINE

## 2024-05-03 PROCEDURE — 99213 OFFICE O/P EST LOW 20 MIN: CPT | Performed by: INTERNAL MEDICINE

## 2024-05-03 PROCEDURE — 81003 URINALYSIS AUTO W/O SCOPE: CPT | Performed by: INTERNAL MEDICINE

## 2024-05-03 PROCEDURE — 3078F DIAST BP <80 MM HG: CPT | Performed by: INTERNAL MEDICINE

## 2024-05-03 PROCEDURE — 3074F SYST BP LT 130 MM HG: CPT | Performed by: INTERNAL MEDICINE

## 2024-05-03 RX ORDER — SULFAMETHOXAZOLE AND TRIMETHOPRIM 800; 160 MG/1; MG/1
1 TABLET ORAL 2 TIMES DAILY
Qty: 6 TABLET | Refills: 0 | Status: SHIPPED | OUTPATIENT
Start: 2024-05-03 | End: 2024-05-06

## 2024-05-03 ASSESSMENT — ENCOUNTER SYMPTOMS
FREQUENCY: 1
DEPRESSION: 0
HEMATURIA: 0
OCCASIONAL FEELINGS OF UNSTEADINESS: 0
LOSS OF SENSATION IN FEET: 0
CHILLS: 0

## 2024-05-03 ASSESSMENT — PATIENT HEALTH QUESTIONNAIRE - PHQ9
2. FEELING DOWN, DEPRESSED OR HOPELESS: NOT AT ALL
SUM OF ALL RESPONSES TO PHQ9 QUESTIONS 1 AND 2: 0
1. LITTLE INTEREST OR PLEASURE IN DOING THINGS: NOT AT ALL

## 2024-05-03 ASSESSMENT — PAIN SCALES - GENERAL: PAINLEVEL: 5

## 2024-05-03 NOTE — PROGRESS NOTES
Subjective   Patient ID: Nika Ricardo is a 78 y.o. female who presents for UTI.    UTI   This is a new problem. The current episode started today. The problem occurs every urination. The problem has been gradually worsening. The quality of the pain is described as burning. The pain is mild. There has been no fever. Associated symptoms include frequency. Pertinent negatives include no chills, hematuria or hesitancy. She has tried increased fluids for the symptoms. The treatment provided no relief. Her past medical history is significant for recurrent UTIs.        Review of Systems   Constitutional:  Negative for chills.   Genitourinary:  Positive for frequency. Negative for hematuria and hesitancy.       Objective   /64 (BP Location: Left arm, Patient Position: Sitting)   Pulse 72   Temp 36.6 °C (97.9 °F)   Wt 82.9 kg (182 lb 11.2 oz)   LMP  (LMP Unknown)   SpO2 97%   BMI 33.42 kg/m²     Physical Exam  Constitutional:       General: She is not in acute distress.     Appearance: She is not ill-appearing.         Assessment/Plan  advised to call if doesn't resolve  Diagnoses and all orders for this visit:  Acute UTI  -     POCT UA Automated manually resulted  -     sulfamethoxazole-trimethoprim (Bactrim DS) 800-160 mg tablet; Take 1 tablet by mouth 2 times a day for 3 days.

## 2024-06-09 ENCOUNTER — LAB REQUISITION (OUTPATIENT)
Dept: LAB | Facility: HOSPITAL | Age: 79
End: 2024-06-09
Payer: MEDICARE

## 2024-06-09 DIAGNOSIS — R30.0 DYSURIA: ICD-10-CM

## 2024-06-09 PROCEDURE — 87086 URINE CULTURE/COLONY COUNT: CPT

## 2024-06-11 LAB — BACTERIA UR CULT: NORMAL

## 2024-07-26 ENCOUNTER — OFFICE VISIT (OUTPATIENT)
Dept: PRIMARY CARE | Facility: CLINIC | Age: 79
End: 2024-07-26
Payer: MEDICARE

## 2024-07-26 VITALS
DIASTOLIC BLOOD PRESSURE: 64 MMHG | WEIGHT: 182.4 LBS | HEART RATE: 72 BPM | SYSTOLIC BLOOD PRESSURE: 114 MMHG | BODY MASS INDEX: 33.36 KG/M2 | OXYGEN SATURATION: 97 %

## 2024-07-26 DIAGNOSIS — E06.3 HYPOTHYROIDISM DUE TO HASHIMOTO'S THYROIDITIS: ICD-10-CM

## 2024-07-26 DIAGNOSIS — K59.04 CHRONIC IDIOPATHIC CONSTIPATION: ICD-10-CM

## 2024-07-26 DIAGNOSIS — I67.89 CEREBRAL MICROVASCULAR DISEASE: Primary | ICD-10-CM

## 2024-07-26 DIAGNOSIS — E04.2 NONTOXIC MULTINODULAR GOITER: ICD-10-CM

## 2024-07-26 DIAGNOSIS — M54.16 LUMBAR RADICULOPATHY, CHRONIC: ICD-10-CM

## 2024-07-26 DIAGNOSIS — E55.9 VITAMIN D DEFICIENCY: ICD-10-CM

## 2024-07-26 DIAGNOSIS — M15.9 GENERALIZED OSTEOARTHRITIS: ICD-10-CM

## 2024-07-26 DIAGNOSIS — E03.8 HYPOTHYROIDISM DUE TO HASHIMOTO'S THYROIDITIS: ICD-10-CM

## 2024-07-26 DIAGNOSIS — I10 BENIGN HYPERTENSION: ICD-10-CM

## 2024-07-26 DIAGNOSIS — Z12.31 ENCOUNTER FOR SCREENING MAMMOGRAM FOR BREAST CANCER: ICD-10-CM

## 2024-07-26 DIAGNOSIS — E66.09 CLASS 1 OBESITY DUE TO EXCESS CALORIES WITH SERIOUS COMORBIDITY AND BODY MASS INDEX (BMI) OF 33.0 TO 33.9 IN ADULT: ICD-10-CM

## 2024-07-26 DIAGNOSIS — E06.3 AUTOIMMUNE THYROIDITIS: ICD-10-CM

## 2024-07-26 PROCEDURE — 1036F TOBACCO NON-USER: CPT | Performed by: INTERNAL MEDICINE

## 2024-07-26 PROCEDURE — 90714 TD VACC NO PRESV 7 YRS+ IM: CPT | Performed by: INTERNAL MEDICINE

## 2024-07-26 PROCEDURE — 1125F AMNT PAIN NOTED PAIN PRSNT: CPT | Performed by: INTERNAL MEDICINE

## 2024-07-26 PROCEDURE — 1157F ADVNC CARE PLAN IN RCRD: CPT | Performed by: INTERNAL MEDICINE

## 2024-07-26 PROCEDURE — 99214 OFFICE O/P EST MOD 30 MIN: CPT | Performed by: INTERNAL MEDICINE

## 2024-07-26 PROCEDURE — 1159F MED LIST DOCD IN RCRD: CPT | Performed by: INTERNAL MEDICINE

## 2024-07-26 PROCEDURE — G2211 COMPLEX E/M VISIT ADD ON: HCPCS | Performed by: INTERNAL MEDICINE

## 2024-07-26 PROCEDURE — 1123F ACP DISCUSS/DSCN MKR DOCD: CPT | Performed by: INTERNAL MEDICINE

## 2024-07-26 PROCEDURE — 3074F SYST BP LT 130 MM HG: CPT | Performed by: INTERNAL MEDICINE

## 2024-07-26 PROCEDURE — 3078F DIAST BP <80 MM HG: CPT | Performed by: INTERNAL MEDICINE

## 2024-07-26 RX ORDER — DONEPEZIL HYDROCHLORIDE 5 MG/1
5 TABLET, FILM COATED ORAL NIGHTLY
Qty: 30 TABLET | Refills: 5 | Status: SHIPPED | OUTPATIENT
Start: 2024-07-26 | End: 2025-01-22

## 2024-07-26 ASSESSMENT — ENCOUNTER SYMPTOMS
SHORTNESS OF BREATH: 0
DIARRHEA: 0
DYSURIA: 0
PSYCHIATRIC NEGATIVE: 1
ARTHRALGIAS: 0
CONSTIPATION: 0
CONSTITUTIONAL NEGATIVE: 1
CARDIOVASCULAR NEGATIVE: 1
ABDOMINAL PAIN: 0
DEPRESSION: 0
ACTIVITY CHANGE: 0
OCCASIONAL FEELINGS OF UNSTEADINESS: 0
COUGH: 0
LOSS OF SENSATION IN FEET: 0

## 2024-07-26 ASSESSMENT — PATIENT HEALTH QUESTIONNAIRE - PHQ9
2. FEELING DOWN, DEPRESSED OR HOPELESS: NOT AT ALL
1. LITTLE INTEREST OR PLEASURE IN DOING THINGS: NOT AT ALL
SUM OF ALL RESPONSES TO PHQ9 QUESTIONS 1 AND 2: 0

## 2024-07-26 ASSESSMENT — PAIN SCALES - GENERAL: PAINLEVEL: 2

## 2024-07-26 NOTE — PROGRESS NOTES
Subjective   Patient ID: Nika Ricardo is a 78 y.o. female who presents for 4 month follow up .    Cerebral microvascular disease on CT scan with mild cognitive impairment; family history of dementia; currently trying prevagen    Hypertension-compliant and stable on current medications     Hyperlipidemia-stable and compliant on meds. Taking atorvastatin-tolerating it so far. Lab Results       Component                Value               Date                       LDLCALC                  58 (L)              03/22/2024               Hypothyroid with thyroiditis and goiter-stable on meds-last TSH   7/2023    Vitamin D def on supplements              Review of Systems   Constitutional: Negative.  Negative for activity change.   Respiratory:  Negative for cough and shortness of breath.    Cardiovascular: Negative.    Gastrointestinal:  Negative for abdominal pain, constipation and diarrhea.   Genitourinary:  Negative for dysuria.   Musculoskeletal:  Negative for arthralgias.   Skin:  Negative for rash.   Neurological:         Word finding difficulty persists-little change in prevegen   Psychiatric/Behavioral: Negative.         Objective   /64 (BP Location: Left arm, Patient Position: Sitting)   Pulse 72   Wt 82.7 kg (182 lb 6.4 oz)   LMP  (LMP Unknown)   SpO2 97%   BMI 33.36 kg/m²     Physical Exam  Constitutional:       General: She is not in acute distress.     Appearance: Normal appearance.   Cardiovascular:      Rate and Rhythm: Normal rate and regular rhythm.      Heart sounds: Normal heart sounds. No murmur heard.     No gallop.   Pulmonary:      Breath sounds: Normal breath sounds. No wheezing, rhonchi or rales.   Skin:     General: Skin is warm and dry.      Findings: No rash.   Neurological:      General: No focal deficit present.      Mental Status: She is alert and oriented to person, place, and time.      Comments: 3/3 short term memory test    Psychiatric:         Mood and Affect: Mood normal.      Comments: Nervous about all this         Assessment/Plan will try adding low dose aricept and stopping prevagen. Continue rest of regimen  Diagnoses and all orders for this visit:  Cerebral microvascular disease  -     donepezil (Aricept) 5 mg tablet; Take 1 tablet (5 mg) by mouth once daily at bedtime.  Benign hypertension  Autoimmune thyroiditis  Hypothyroidism due to Hashimoto's thyroiditis  Nontoxic multinodular goiter  Class 1 obesity due to excess calories with serious comorbidity and body mass index (BMI) of 33.0 to 33.9 in adult  Vitamin D deficiency  Chronic idiopathic constipation  Generalized osteoarthritis  Lumbar radiculopathy, chronic  Encounter for screening mammogram for breast cancer  -     BI mammo bilateral screening tomosynthesis; Future  Other orders  -     Follow Up In Primary Care - Established  -     Td vaccine, age 7 years and older (TDVAX)

## 2024-08-20 ENCOUNTER — OFFICE VISIT (OUTPATIENT)
Dept: PRIMARY CARE | Facility: CLINIC | Age: 79
End: 2024-08-20
Payer: MEDICARE

## 2024-08-20 VITALS
SYSTOLIC BLOOD PRESSURE: 110 MMHG | HEART RATE: 51 BPM | DIASTOLIC BLOOD PRESSURE: 80 MMHG | BODY MASS INDEX: 33.65 KG/M2 | WEIGHT: 184 LBS | OXYGEN SATURATION: 99 %

## 2024-08-20 DIAGNOSIS — H61.23 IMPACTED CERUMEN, BILATERAL: Primary | ICD-10-CM

## 2024-08-20 PROCEDURE — 3074F SYST BP LT 130 MM HG: CPT | Performed by: INTERNAL MEDICINE

## 2024-08-20 PROCEDURE — 1123F ACP DISCUSS/DSCN MKR DOCD: CPT | Performed by: INTERNAL MEDICINE

## 2024-08-20 PROCEDURE — 69210 REMOVE IMPACTED EAR WAX UNI: CPT | Performed by: INTERNAL MEDICINE

## 2024-08-20 PROCEDURE — 1036F TOBACCO NON-USER: CPT | Performed by: INTERNAL MEDICINE

## 2024-08-20 PROCEDURE — 1157F ADVNC CARE PLAN IN RCRD: CPT | Performed by: INTERNAL MEDICINE

## 2024-08-20 PROCEDURE — 1159F MED LIST DOCD IN RCRD: CPT | Performed by: INTERNAL MEDICINE

## 2024-08-20 PROCEDURE — 99212 OFFICE O/P EST SF 10 MIN: CPT | Performed by: INTERNAL MEDICINE

## 2024-08-20 PROCEDURE — 3079F DIAST BP 80-89 MM HG: CPT | Performed by: INTERNAL MEDICINE

## 2024-08-20 PROCEDURE — 1126F AMNT PAIN NOTED NONE PRSNT: CPT | Performed by: INTERNAL MEDICINE

## 2024-08-20 ASSESSMENT — LIFESTYLE VARIABLES
HOW OFTEN DO YOU HAVE A DRINK CONTAINING ALCOHOL: MONTHLY OR LESS
SKIP TO QUESTIONS 9-10: 1
HOW OFTEN DO YOU HAVE SIX OR MORE DRINKS ON ONE OCCASION: NEVER
HOW MANY STANDARD DRINKS CONTAINING ALCOHOL DO YOU HAVE ON A TYPICAL DAY: 1 OR 2
AUDIT-C TOTAL SCORE: 1

## 2024-08-20 ASSESSMENT — ENCOUNTER SYMPTOMS
LOSS OF SENSATION IN FEET: 0
DEPRESSION: 0
OCCASIONAL FEELINGS OF UNSTEADINESS: 0

## 2024-08-20 ASSESSMENT — PAIN SCALES - GENERAL: PAINLEVEL: 0-NO PAIN

## 2024-08-20 ASSESSMENT — PATIENT HEALTH QUESTIONNAIRE - PHQ9
SUM OF ALL RESPONSES TO PHQ9 QUESTIONS 1 AND 2: 0
1. LITTLE INTEREST OR PLEASURE IN DOING THINGS: NOT AT ALL
2. FEELING DOWN, DEPRESSED OR HOPELESS: NOT AT ALL

## 2024-08-20 NOTE — PROGRESS NOTES
Subjective   Patient ID: Nika Ricardo is a 78 y.o. female who presents for Ear Fullness.    Using drops weekly but left ear completely blocked and ringing. Right ear feels plugged but not completely blocked           Objective   /80   Pulse 51   Wt 83.5 kg (184 lb)   LMP  (LMP Unknown)   SpO2 99%   BMI 33.65 kg/m²                        Physical Exam  HENT:      Right Ear: There is impacted cerumen.      Left Ear: There is impacted cerumen.       Assessment/Plan   Assessment & Plan  Impacted cerumen, bilateral  Will advise weekly drops and recheck at each follow up     successfully cleared by nurse

## 2024-08-21 ENCOUNTER — HOSPITAL ENCOUNTER (OUTPATIENT)
Dept: RADIOLOGY | Facility: HOSPITAL | Age: 79
Discharge: HOME | End: 2024-08-21
Payer: MEDICARE

## 2024-08-21 VITALS — BODY MASS INDEX: 33.31 KG/M2 | HEIGHT: 62 IN | WEIGHT: 181 LBS

## 2024-08-21 DIAGNOSIS — Z12.31 ENCOUNTER FOR SCREENING MAMMOGRAM FOR BREAST CANCER: ICD-10-CM

## 2024-08-21 PROCEDURE — 77067 SCR MAMMO BI INCL CAD: CPT | Performed by: RADIOLOGY

## 2024-08-21 PROCEDURE — 77063 BREAST TOMOSYNTHESIS BI: CPT | Performed by: RADIOLOGY

## 2024-08-21 PROCEDURE — 77067 SCR MAMMO BI INCL CAD: CPT

## 2024-08-21 NOTE — LETTER
August 26, 2024     Nika Maos  [address]  [facility] [address]      Dear Ms. Davion:    Below are the results from your recent visit:   Negative Mammogram - repeat in 1 year     Resulted Orders   BI mammo bilateral screening tomosynthesis    Narrative    Interpreted By:  Cyndi Phillip,   STUDY:  BI MAMMO BILATERAL SCREENING TOMOSYNTHESIS;  8/21/2024 1:21 pm      ACCESSION NUMBER(S):  [id number]      ORDERING CLINICIAN:  CORINE GOMEZ      INDICATION:  Screening.      COMPARISON:  01/06/2023 and 01/05/2022.      FINDINGS:  2D and tomosynthesis images were reviewed at 1 mm slice thickness.      Density:  There are areas of scattered fibroglandular tissue.      No suspicious masses or calcifications are identified.        Impression    No mammographic evidence of malignancy.      BI-RADS CATEGORY:  BI-RADS Category:  1 Negative.  Recommendation:  Annual Screening.  Recommended Date:  1 Year.  Laterality:  Bilateral.              For any future breast imaging appointments, please call [phone]  [phone]).          MACRO:  None      Signed by: Cyndi Phillip 8/25/2024 10:55 AM  Dictation workstation:   [id number]     The test results show that your current treatment is working. Please continue your current medication and plan. We recommend that you repeat the above test(s) in 1 year.    If you have any questions or concerns, please don't hesitate to call.         Sincerely,    Dr. Corine Gomez  [facility]  [address]  [place] [address]  Phone: [phone]

## 2024-08-26 DIAGNOSIS — Z12.31 ENCOUNTER FOR SCREENING MAMMOGRAM FOR MALIGNANT NEOPLASM OF BREAST: ICD-10-CM

## 2024-10-09 DIAGNOSIS — I67.89 CEREBRAL MICROVASCULAR DISEASE: ICD-10-CM

## 2024-10-09 DIAGNOSIS — E03.9 ACQUIRED HYPOTHYROIDISM: ICD-10-CM

## 2024-10-09 RX ORDER — ATORVASTATIN CALCIUM 20 MG/1
20 TABLET, FILM COATED ORAL DAILY
Qty: 30 TABLET | Refills: 3 | Status: SHIPPED | OUTPATIENT
Start: 2024-10-09

## 2024-10-09 RX ORDER — LEVOTHYROXINE SODIUM 75 UG/1
75 TABLET ORAL
Qty: 90 TABLET | Refills: 3 | Status: SHIPPED | OUTPATIENT
Start: 2024-10-09

## 2024-10-22 DIAGNOSIS — R21 RASH: Primary | ICD-10-CM

## 2024-10-22 RX ORDER — CLOTRIMAZOLE AND BETAMETHASONE DIPROPIONATE 10; .5 MG/ML; MG/ML
LOTION TOPICAL
Qty: 30 ML | Refills: 3 | Status: SHIPPED | OUTPATIENT
Start: 2024-10-22

## 2024-10-31 DIAGNOSIS — I67.89 CEREBRAL MICROVASCULAR DISEASE: ICD-10-CM

## 2024-10-31 RX ORDER — ATORVASTATIN CALCIUM 20 MG/1
20 TABLET, FILM COATED ORAL DAILY
Qty: 90 TABLET | Refills: 3 | Status: SHIPPED | OUTPATIENT
Start: 2024-10-31

## 2024-11-26 ENCOUNTER — OFFICE VISIT (OUTPATIENT)
Dept: PRIMARY CARE | Facility: CLINIC | Age: 79
End: 2024-11-26
Payer: MEDICARE

## 2024-11-26 VITALS
DIASTOLIC BLOOD PRESSURE: 74 MMHG | HEART RATE: 70 BPM | BODY MASS INDEX: 33.36 KG/M2 | WEIGHT: 182.4 LBS | SYSTOLIC BLOOD PRESSURE: 138 MMHG | OXYGEN SATURATION: 99 %

## 2024-11-26 DIAGNOSIS — E06.3 AUTOIMMUNE THYROIDITIS: ICD-10-CM

## 2024-11-26 DIAGNOSIS — E66.09 CLASS 1 OBESITY DUE TO EXCESS CALORIES WITH SERIOUS COMORBIDITY AND BODY MASS INDEX (BMI) OF 33.0 TO 33.9 IN ADULT: ICD-10-CM

## 2024-11-26 DIAGNOSIS — I67.89 CEREBRAL MICROVASCULAR DISEASE: ICD-10-CM

## 2024-11-26 DIAGNOSIS — M54.16 LUMBAR RADICULOPATHY, CHRONIC: ICD-10-CM

## 2024-11-26 DIAGNOSIS — E04.2 NONTOXIC MULTINODULAR GOITER: ICD-10-CM

## 2024-11-26 DIAGNOSIS — H61.23 IMPACTED CERUMEN, BILATERAL: ICD-10-CM

## 2024-11-26 DIAGNOSIS — E55.9 VITAMIN D DEFICIENCY: ICD-10-CM

## 2024-11-26 DIAGNOSIS — E66.811 CLASS 1 OBESITY DUE TO EXCESS CALORIES WITH SERIOUS COMORBIDITY AND BODY MASS INDEX (BMI) OF 33.0 TO 33.9 IN ADULT: ICD-10-CM

## 2024-11-26 DIAGNOSIS — Z78.0 MENOPAUSE: ICD-10-CM

## 2024-11-26 DIAGNOSIS — I10 BENIGN HYPERTENSION: Primary | ICD-10-CM

## 2024-11-26 DIAGNOSIS — E06.3 HYPOTHYROIDISM DUE TO HASHIMOTO THYROIDITIS: ICD-10-CM

## 2024-11-26 DIAGNOSIS — M15.9 GENERALIZED OSTEOARTHRITIS: ICD-10-CM

## 2024-11-26 PROCEDURE — 1125F AMNT PAIN NOTED PAIN PRSNT: CPT | Performed by: INTERNAL MEDICINE

## 2024-11-26 PROCEDURE — 99214 OFFICE O/P EST MOD 30 MIN: CPT | Performed by: INTERNAL MEDICINE

## 2024-11-26 PROCEDURE — 3078F DIAST BP <80 MM HG: CPT | Performed by: INTERNAL MEDICINE

## 2024-11-26 PROCEDURE — 69210 REMOVE IMPACTED EAR WAX UNI: CPT | Performed by: INTERNAL MEDICINE

## 2024-11-26 PROCEDURE — 1159F MED LIST DOCD IN RCRD: CPT | Performed by: INTERNAL MEDICINE

## 2024-11-26 PROCEDURE — 1157F ADVNC CARE PLAN IN RCRD: CPT | Performed by: INTERNAL MEDICINE

## 2024-11-26 PROCEDURE — 3075F SYST BP GE 130 - 139MM HG: CPT | Performed by: INTERNAL MEDICINE

## 2024-11-26 PROCEDURE — 1123F ACP DISCUSS/DSCN MKR DOCD: CPT | Performed by: INTERNAL MEDICINE

## 2024-11-26 PROCEDURE — 1036F TOBACCO NON-USER: CPT | Performed by: INTERNAL MEDICINE

## 2024-11-26 RX ORDER — DONEPEZIL HYDROCHLORIDE 10 MG/1
10 TABLET, FILM COATED ORAL NIGHTLY
Qty: 90 TABLET | Refills: 3 | Status: SHIPPED | OUTPATIENT
Start: 2024-11-26 | End: 2025-11-26

## 2024-11-26 ASSESSMENT — ENCOUNTER SYMPTOMS
LOSS OF SENSATION IN FEET: 0
ACTIVITY CHANGE: 0
ABDOMINAL PAIN: 0
ARTHRALGIAS: 0
OCCASIONAL FEELINGS OF UNSTEADINESS: 0
COUGH: 0
SHORTNESS OF BREATH: 0
CARDIOVASCULAR NEGATIVE: 1
DIARRHEA: 0
DEPRESSION: 0
PSYCHIATRIC NEGATIVE: 1
DYSURIA: 0
CONSTIPATION: 0
CONSTITUTIONAL NEGATIVE: 1

## 2024-11-26 ASSESSMENT — PAIN SCALES - GENERAL: PAINLEVEL_OUTOF10: 4

## 2024-11-26 ASSESSMENT — PATIENT HEALTH QUESTIONNAIRE - PHQ9
1. LITTLE INTEREST OR PLEASURE IN DOING THINGS: NOT AT ALL
2. FEELING DOWN, DEPRESSED OR HOPELESS: NOT AT ALL
SUM OF ALL RESPONSES TO PHQ9 QUESTIONS 1 AND 2: 0

## 2024-11-26 NOTE — PROGRESS NOTES
Subjective   Patient ID: Nika Ricardo is a 79 y.o. female who presents for 4 month follow up .    Cerebral microvascular disease on CT scan with mild cognitive impairment; family history of dementia; now taking donepezil 5 mg--hasn't noticed much difference-hard to tell if worse    Hypertension-compliant and stable on current medications     Hyperlipidemia-stable and compliant on meds. Taking atorvastatin-tolerating it so far. Lab Results       Component                Value               Date                       LDLCALC                  58 (L)              03/22/2024               Hypothyroid with thyroiditis and goiter-stable on meds-last TSH   7/2023    Vitamin D def on supplements     Exercise-high intensity water exercise-5 mornings /week; walks as back allows           Review of Systems   Constitutional: Negative.  Negative for activity change.   Respiratory:  Negative for cough and shortness of breath.    Cardiovascular: Negative.    Gastrointestinal:  Negative for abdominal pain, constipation and diarrhea.   Genitourinary:  Negative for dysuria.   Musculoskeletal:  Negative for arthralgias.   Skin:  Negative for rash.   Neurological:         Word finding difficulty persists-little change yet   Psychiatric/Behavioral: Negative.         Objective   /74 (BP Location: Left arm, Patient Position: Sitting)   Pulse 70   Wt 82.7 kg (182 lb 6.4 oz)   LMP  (LMP Unknown)   SpO2 99%   BMI 33.36 kg/m²     Physical Exam  Constitutional:       General: She is not in acute distress.     Appearance: Normal appearance.   HENT:      Right Ear: There is impacted cerumen.      Left Ear: There is impacted cerumen.   Cardiovascular:      Rate and Rhythm: Normal rate and regular rhythm.      Heart sounds: Normal heart sounds. No murmur heard.     No gallop.   Pulmonary:      Breath sounds: Normal breath sounds. No wheezing, rhonchi or rales.   Skin:     General: Skin is warm and dry.      Findings: No rash.    Neurological:      General: No focal deficit present.      Mental Status: She is alert and oriented to person, place, and time.      Comments: 3/3 short term memory test   Psychiatric:         Mood and Affect: Mood normal.      Comments: Nervous about all this         Assessment/Plan  will increase donepazil  Diagnoses and all orders for this visit:  Benign hypertension  -     Comprehensive Metabolic Panel; Future  Autoimmune thyroiditis  Hypothyroidism due to Hashimoto thyroiditis  -     TSH with reflex to Free T4 if abnormal; Future  Nontoxic multinodular goiter  Class 1 obesity due to excess calories with serious comorbidity and body mass index (BMI) of 33.0 to 33.9 in adult  Vitamin D deficiency  Generalized osteoarthritis  Cerebral microvascular disease  -     donepezil (Aricept) 10 mg tablet; Take 1 tablet (10 mg) by mouth once daily at bedtime.  -     Lipid Panel; Future  Lumbar radiculopathy, chronic  Menopause  -     XR DEXA bone density; Future  Impacted cerumen, bilateral  -     Ear Cerumen Removal      Current Outpatient Medications:     aspirin 81 mg EC tablet, Take 1 tablet (81 mg) by mouth once daily., Disp: 30 tablet, Rfl: 11    atorvastatin (Lipitor) 20 mg tablet, TAKE 1 TABLET(20 MG) BY MOUTH EVERY DAY, Disp: 90 tablet, Rfl: 3    clotrimazole-betamethasone (Lotrisone) lotion, APPLY TOPICALLY TO THE AFFECTED AREA TWICE DAILY, Disp: 30 mL, Rfl: 3    diphenhydrAMINE-acetaminophen (Tylenol PM)  mg per tablet, Take 1 tablet by mouth as needed at bedtime., Disp: , Rfl:     ergocalciferol (Vitamin D-2) 1.25 MG (84961 UT) capsule, TAKE ONE CAPSULE BY MOUTH TWICE MONTHLY, Disp: 6 capsule, Rfl: 3    levothyroxine (Synthroid, Levoxyl) 75 mcg tablet, TAKE 1 TABLET BY MOUTH EVERY MORNING ON AN EMPTY STOMACH, Disp: 90 tablet, Rfl: 3    lisinopril 20 mg tablet, TAKE 1 TABLET BY MOUTH EVERY DAY, Disp: 90 tablet, Rfl: 3    multivit-minerals/folic acid (CENTRUM ADULTS ORAL), as directed Orally, Disp: , Rfl:      donepezil (Aricept) 10 mg tablet, Take 1 tablet (10 mg) by mouth once daily at bedtime., Disp: 90 tablet, Rfl: 3

## 2024-11-26 NOTE — PATIENT INSTRUCTIONS
Increase donepezil to 10 mg---may take 2 x 5 mg tablets til gone then 1 of the 10mg    Have labs drawn when you are fasting for at least 8 hours

## 2024-12-07 ENCOUNTER — LAB (OUTPATIENT)
Dept: LAB | Facility: LAB | Age: 79
End: 2024-12-07
Payer: MEDICARE

## 2024-12-07 DIAGNOSIS — I10 BENIGN HYPERTENSION: ICD-10-CM

## 2024-12-07 DIAGNOSIS — E06.3 HYPOTHYROIDISM DUE TO HASHIMOTO THYROIDITIS: ICD-10-CM

## 2024-12-07 DIAGNOSIS — I67.89 CEREBRAL MICROVASCULAR DISEASE: ICD-10-CM

## 2024-12-07 LAB
ALBUMIN SERPL BCP-MCNC: 4.2 G/DL (ref 3.4–5)
ALP SERPL-CCNC: 42 U/L (ref 33–136)
ALT SERPL W P-5'-P-CCNC: 16 U/L (ref 7–45)
ANION GAP SERPL CALCULATED.3IONS-SCNC: 10 MMOL/L (ref 10–20)
AST SERPL W P-5'-P-CCNC: 16 U/L (ref 9–39)
BILIRUB SERPL-MCNC: 0.6 MG/DL (ref 0–1.2)
BUN SERPL-MCNC: 17 MG/DL (ref 6–23)
CALCIUM SERPL-MCNC: 9.6 MG/DL (ref 8.6–10.3)
CHLORIDE SERPL-SCNC: 105 MMOL/L (ref 98–107)
CHOLEST SERPL-MCNC: 138 MG/DL (ref 0–199)
CHOLEST/HDLC SERPL: 2.1 {RATIO}
CO2 SERPL-SCNC: 28 MMOL/L (ref 21–32)
CREAT SERPL-MCNC: 0.73 MG/DL (ref 0.5–1.05)
EGFRCR SERPLBLD CKD-EPI 2021: 84 ML/MIN/1.73M*2
GLUCOSE SERPL-MCNC: 88 MG/DL (ref 74–99)
HDLC SERPL-MCNC: 66.4 MG/DL
LDLC SERPL CALC-MCNC: 58 MG/DL
NON HDL CHOLESTEROL: 72 MG/DL (ref 0–149)
POTASSIUM SERPL-SCNC: 4.6 MMOL/L (ref 3.5–5.3)
PROT SERPL-MCNC: 7.6 G/DL (ref 6.4–8.2)
SODIUM SERPL-SCNC: 138 MMOL/L (ref 136–145)
TRIGL SERPL-MCNC: 67 MG/DL (ref 0–149)
TSH SERPL-ACNC: 1.03 MIU/L (ref 0.44–3.98)
VLDL: 13 MG/DL (ref 0–40)

## 2024-12-07 PROCEDURE — 36415 COLL VENOUS BLD VENIPUNCTURE: CPT

## 2024-12-07 PROCEDURE — 80053 COMPREHEN METABOLIC PANEL: CPT

## 2024-12-07 PROCEDURE — 84443 ASSAY THYROID STIM HORMONE: CPT

## 2024-12-07 PROCEDURE — 80061 LIPID PANEL: CPT

## 2024-12-07 NOTE — LETTER
December 9, 2024     Nika LYNN Davion  [address]  Counts include [address] beds at the [facility] [address]      Dear Ms. Ricardo:    Below are the results from your recent visit:  Labs normal-continue the same   Resulted Orders   Comprehensive Metabolic Panel   Result Value Ref Range    Glucose 88 74 - 99 mg/dL    Sodium 138 136 - 145 mmol/L    Potassium 4.6 3.5 - 5.3 mmol/L    Chloride 105 98 - 107 mmol/L    Bicarbonate 28 21 - 32 mmol/L    Anion Gap 10 10 - 20 mmol/L    Urea Nitrogen 17 6 - 23 mg/dL    Creatinine 0.73 0.50 - 1.05 mg/dL    eGFR 84 >60 mL/min/1.73m*2      Comment:      Calculations of estimated GFR are performed using the 2021 CKD-EPI Study Refit equation without the race variable for the IDMS-Traceable creatinine methods.  https://jasn.asnjournals.org/content/early/2021/09/22/ASN.9435938477    Calcium 9.6 8.6 - 10.3 mg/dL    Albumin 4.2 3.4 - 5.0 g/dL    Alkaline Phosphatase 42 33 - 136 U/L    Total Protein 7.6 6.4 - 8.2 g/dL    AST 16 9 - 39 U/L    Bilirubin, Total 0.6 0.0 - 1.2 mg/dL    ALT 16 7 - 45 U/L      Comment:      Patients treated with Sulfasalazine may generate falsely decreased results for ALT.   Lipid Panel   Result Value Ref Range    Cholesterol 138 0 - 199 mg/dL      Comment:            Age      Desirable   Borderline High   High     0-19 Y     0 - 169       170 - 199     >/= 200    20-24 Y     0 - 189       190 - 224     >/= 225         >24 Y     0 - 199       200 - 239     >/= 240   **All ranges are based on fasting samples. Specific   therapeutic targets will vary based on patient-specific   cardiac risk.    Pediatric guidelines reference:Pediatrics 2011, 128(S5).Adult guidelines reference: NCEP ATPIII Guidelines,CHRISTIANO 2001, 258:2486-97    Venipuncture immediately after or during the administration of Metamizole may lead to falsely low results. Testing should be performed immediately prior to Metamizole dosing.    HDL-Cholesterol 66.4 mg/dL      Comment:        Age       Very Low   Low     Normal    High     0-19 Y    < 35      < 40     40-45     ----  20-24 Y    ----     < 40      >45      ----        >24 Y      ----     < 40     40-60      >60      Cholesterol/HDL Ratio 2.1       Comment:        Ref Values  Desirable  < 3.4  High Risk  > 5.0    LDL Calculated 58 <=99 mg/dL      Comment:                                  Near   Borderline      AGE      Desirable  Optimal    High     High     Very High     0-19 Y     0 - 109     ---    110-129   >/= 130     ----    20-24 Y     0 - 119     ---    120-159   >/= 160     ----      >24 Y     0 -  99   100-129  130-159   160-189     >/=190      VLDL 13 0 - 40 mg/dL    Triglycerides 67 0 - 149 mg/dL      Comment:      Age              Desirable        Borderline         High        Very High  SEX:B           mg/dL             mg/dL               mg/dL      mg/dL  <=14D                       ----               ----        ----  15D-365D                    ----               ----        ----  1Y-9Y           0-74               75-99             >=100       ----  10Y-19Y        0-89                            >=130       ----  20Y-24Y        0-114             115-149             >=150      ----  >= 25Y         0-149             150-199             200-499    >=500      Venipuncture immediately after or during the administration of Metamizole may lead to falsely low results. Testing should be performed immediately prior to Metamizole dosing.    Non HDL Cholesterol 72 0 - 149 mg/dL      Comment:            Age       Desirable   Borderline High   High     Very High     0-19 Y     0 - 119       120 - 144     >/= 145    >/= 160    20-24 Y     0 - 149       150 - 189     >/= 190      ----         >24 Y    30 mg/dL above LDL Cholesterol goal     TSH with reflex to Free T4 if abnormal   Result Value Ref Range    Thyroid Stimulating Hormone 1.03 0.44 - 3.98 mIU/L    Narrative    TSH testing is performed using different testing methodology at [facility]  than at other system hospitals. Direct result comparisons should only be made within the same method.       The test results show that your current treatment is working. Please continue your current medication and plan.     If you have any questions or concerns, please don't hesitate to call.         Sincerely,        Mayra Gomez M.D. and staff.

## 2024-12-10 DIAGNOSIS — I67.89 CEREBRAL MICROVASCULAR DISEASE: ICD-10-CM

## 2024-12-10 RX ORDER — ASPIRIN 81 MG/1
TABLET ORAL
Qty: 30 TABLET | Refills: 11 | Status: SHIPPED | OUTPATIENT
Start: 2024-12-10

## 2025-01-07 DIAGNOSIS — E55.9 VITAMIN D DEFICIENCY: ICD-10-CM

## 2025-01-07 RX ORDER — ERGOCALCIFEROL 1.25 MG/1
CAPSULE ORAL
Qty: 6 CAPSULE | Refills: 3 | Status: SHIPPED | OUTPATIENT
Start: 2025-01-07

## 2025-01-20 ENCOUNTER — OFFICE VISIT (OUTPATIENT)
Dept: PRIMARY CARE | Facility: CLINIC | Age: 80
End: 2025-01-20
Payer: MEDICARE

## 2025-01-20 VITALS
DIASTOLIC BLOOD PRESSURE: 66 MMHG | HEART RATE: 66 BPM | OXYGEN SATURATION: 97 % | WEIGHT: 182.2 LBS | SYSTOLIC BLOOD PRESSURE: 126 MMHG | BODY MASS INDEX: 33.32 KG/M2

## 2025-01-20 DIAGNOSIS — H61.23 IMPACTED CERUMEN, BILATERAL: Primary | ICD-10-CM

## 2025-01-20 PROCEDURE — 1159F MED LIST DOCD IN RCRD: CPT | Performed by: INTERNAL MEDICINE

## 2025-01-20 PROCEDURE — 1125F AMNT PAIN NOTED PAIN PRSNT: CPT | Performed by: INTERNAL MEDICINE

## 2025-01-20 PROCEDURE — 1123F ACP DISCUSS/DSCN MKR DOCD: CPT | Performed by: INTERNAL MEDICINE

## 2025-01-20 PROCEDURE — 69210 REMOVE IMPACTED EAR WAX UNI: CPT | Performed by: INTERNAL MEDICINE

## 2025-01-20 PROCEDURE — 1036F TOBACCO NON-USER: CPT | Performed by: INTERNAL MEDICINE

## 2025-01-20 PROCEDURE — 1157F ADVNC CARE PLAN IN RCRD: CPT | Performed by: INTERNAL MEDICINE

## 2025-01-20 PROCEDURE — 3074F SYST BP LT 130 MM HG: CPT | Performed by: INTERNAL MEDICINE

## 2025-01-20 PROCEDURE — 3078F DIAST BP <80 MM HG: CPT | Performed by: INTERNAL MEDICINE

## 2025-01-20 ASSESSMENT — PAIN SCALES - GENERAL: PAINLEVEL_OUTOF10: 3

## 2025-01-20 NOTE — PROGRESS NOTES
Bilat impacted cerumen. Using drops. With use of curetage and irrigation: L nearly completely cleared. R with some remaining. Willuse drops for another week and re-irrigate. If unsuccessful then, will refer to ENT

## 2025-01-27 ENCOUNTER — CLINICAL SUPPORT (OUTPATIENT)
Dept: PRIMARY CARE | Facility: CLINIC | Age: 80
End: 2025-01-27
Payer: MEDICARE

## 2025-01-27 DIAGNOSIS — H61.23 IMPACTED CERUMEN OF BOTH EARS: ICD-10-CM

## 2025-04-01 ENCOUNTER — OFFICE VISIT (OUTPATIENT)
Dept: PRIMARY CARE | Facility: CLINIC | Age: 80
End: 2025-04-01
Payer: MEDICARE

## 2025-04-01 VITALS
SYSTOLIC BLOOD PRESSURE: 130 MMHG | OXYGEN SATURATION: 97 % | DIASTOLIC BLOOD PRESSURE: 68 MMHG | BODY MASS INDEX: 32.59 KG/M2 | WEIGHT: 178.2 LBS | HEART RATE: 70 BPM

## 2025-04-01 DIAGNOSIS — E06.3 AUTOIMMUNE THYROIDITIS: ICD-10-CM

## 2025-04-01 DIAGNOSIS — E04.2 NONTOXIC MULTINODULAR GOITER: ICD-10-CM

## 2025-04-01 DIAGNOSIS — E55.9 VITAMIN D DEFICIENCY: ICD-10-CM

## 2025-04-01 DIAGNOSIS — E06.3 HYPOTHYROIDISM DUE TO HASHIMOTO THYROIDITIS: ICD-10-CM

## 2025-04-01 DIAGNOSIS — E66.09 CLASS 1 OBESITY DUE TO EXCESS CALORIES WITH SERIOUS COMORBIDITY AND BODY MASS INDEX (BMI) OF 33.0 TO 33.9 IN ADULT: ICD-10-CM

## 2025-04-01 DIAGNOSIS — K59.04 CHRONIC IDIOPATHIC CONSTIPATION: ICD-10-CM

## 2025-04-01 DIAGNOSIS — E66.811 CLASS 1 OBESITY DUE TO EXCESS CALORIES WITH SERIOUS COMORBIDITY AND BODY MASS INDEX (BMI) OF 33.0 TO 33.9 IN ADULT: ICD-10-CM

## 2025-04-01 DIAGNOSIS — I10 BENIGN HYPERTENSION: Primary | ICD-10-CM

## 2025-04-01 DIAGNOSIS — M54.16 LUMBAR RADICULOPATHY, CHRONIC: ICD-10-CM

## 2025-04-01 DIAGNOSIS — M15.9 GENERALIZED OSTEOARTHRITIS: ICD-10-CM

## 2025-04-01 DIAGNOSIS — I67.89 CEREBRAL MICROVASCULAR DISEASE: ICD-10-CM

## 2025-04-01 PROCEDURE — 1157F ADVNC CARE PLAN IN RCRD: CPT | Performed by: INTERNAL MEDICINE

## 2025-04-01 PROCEDURE — 1159F MED LIST DOCD IN RCRD: CPT | Performed by: INTERNAL MEDICINE

## 2025-04-01 PROCEDURE — 99214 OFFICE O/P EST MOD 30 MIN: CPT | Performed by: INTERNAL MEDICINE

## 2025-04-01 PROCEDURE — 3075F SYST BP GE 130 - 139MM HG: CPT | Performed by: INTERNAL MEDICINE

## 2025-04-01 PROCEDURE — 1123F ACP DISCUSS/DSCN MKR DOCD: CPT | Performed by: INTERNAL MEDICINE

## 2025-04-01 PROCEDURE — 3078F DIAST BP <80 MM HG: CPT | Performed by: INTERNAL MEDICINE

## 2025-04-01 PROCEDURE — G2211 COMPLEX E/M VISIT ADD ON: HCPCS | Performed by: INTERNAL MEDICINE

## 2025-04-01 PROCEDURE — 1036F TOBACCO NON-USER: CPT | Performed by: INTERNAL MEDICINE

## 2025-04-01 PROCEDURE — 1126F AMNT PAIN NOTED NONE PRSNT: CPT | Performed by: INTERNAL MEDICINE

## 2025-04-01 ASSESSMENT — ENCOUNTER SYMPTOMS
CONSTITUTIONAL NEGATIVE: 1
PSYCHIATRIC NEGATIVE: 1
SHORTNESS OF BREATH: 0
DIARRHEA: 0
PALPITATIONS: 1
DIZZINESS: 1
DYSURIA: 0
ARTHRALGIAS: 0
ACTIVITY CHANGE: 0
CONSTIPATION: 0
ABDOMINAL PAIN: 0
COUGH: 0

## 2025-04-01 ASSESSMENT — PAIN SCALES - GENERAL: PAINLEVEL_OUTOF10: 0-NO PAIN

## 2025-04-01 NOTE — PROGRESS NOTES
Subjective   Patient ID: Nika Ricardo is a 79 y.o. female who presents for 4 month follow up .    Cerebral microvascular disease on CT scan with mild cognitive impairment; family history of dementia; stopped donepazil  because worried it was causing some weird side effects--hadn't seen any improvement too date    Hypertension-compliant and stable on current medications     Hyperlipidemia-stable and compliant on meds. Taking atorvastatin-tolerating it so far. Lab Results       Component                Value               Date                       LDLCALC                  58 (L)              03/22/2024               Hypothyroid with thyroiditis and goiter-stable on meds-last TSH   7/2023    Vitamin D def on supplements     Exercise-high intensity water exercise-5 mornings /week; walks as back allows         Review of Systems   Constitutional: Negative.  Negative for activity change.   Respiratory:  Negative for cough and shortness of breath.    Cardiovascular:  Positive for palpitations (occas and mild). Negative for chest pain and leg swelling.   Gastrointestinal:  Negative for abdominal pain, constipation (better with daily miralax) and diarrhea.   Genitourinary:  Negative for dysuria.   Musculoskeletal:  Negative for arthralgias.        Denies fall since last visit   Skin:  Negative for rash.   Neurological:  Positive for dizziness (brief if moves quickly--on discussion drinks very little water).        Word finding difficulty persists-little change yet   Psychiatric/Behavioral: Negative.         Objective   /68 (BP Location: Left arm, Patient Position: Sitting)   Pulse 70   Wt 80.8 kg (178 lb 3.2 oz)   LMP  (LMP Unknown)   SpO2 97%   BMI 32.59 kg/m²     Physical Exam  Constitutional:       General: She is not in acute distress.     Appearance: Normal appearance.   HENT:      Mouth/Throat:      Mouth: Mucous membranes are dry.   Cardiovascular:      Rate and Rhythm: Normal rate and regular  rhythm.      Heart sounds: Normal heart sounds. No murmur heard.     No gallop.   Pulmonary:      Breath sounds: Normal breath sounds. No wheezing, rhonchi or rales.   Skin:     General: Skin is warm and dry.      Findings: No rash.      Comments: Poor skin turgur   Neurological:      General: No focal deficit present.      Mental Status: She is alert and oriented to person, place, and time.      Comments: 3/3 short term memory test   Psychiatric:         Mood and Affect: Mood normal.      Comments: Nervous about all this         Assessment/Plan  appears many of her SE may be from lack of liquid-will try increasing water every day and if symptoms resolve will retry [facility]  Diagnoses and all orders for this visit:  Benign hypertension  Autoimmune thyroiditis  Hypothyroidism due to Hashimoto thyroiditis  Nontoxic multinodular goiter  Class 1 obesity due to excess calories with serious comorbidity and body mass index (BMI) of 33.0 to 33.9 in adult  Vitamin D deficiency  Chronic idiopathic constipation  Generalized osteoarthritis  Cerebral microvascular disease  Lumbar radiculopathy, chronic      Current Outpatient Medications:     aspirin 81 mg EC tablet, TAKE 1 TABLET(81 MG) BY MOUTH EVERY DAY, Disp: 30 tablet, Rfl: 11    atorvastatin (Lipitor) 20 mg tablet, TAKE 1 TABLET(20 MG) BY MOUTH EVERY DAY, Disp: 90 tablet, Rfl: 3    clotrimazole-betamethasone (Lotrisone) lotion, APPLY TOPICALLY TO THE AFFECTED AREA TWICE DAILY, Disp: 30 mL, Rfl: 3    diphenhydrAMINE-acetaminophen (Tylenol PM)  mg per tablet, Take 1 tablet by mouth as needed at bedtime., Disp: , Rfl:     ergocalciferol (Vitamin D-2) 1.25 MG (07353 UT) capsule, TAKE 1 CAPSULE BY MOUTH 2 TIMES MONTHLY, Disp: 6 capsule, Rfl: 3    levothyroxine (Synthroid, Levoxyl) 75 mcg tablet, TAKE 1 TABLET BY MOUTH EVERY MORNING ON AN EMPTY STOMACH, Disp: 90 tablet, Rfl: 3    lisinopril 20 mg tablet, TAKE 1 TABLET BY MOUTH EVERY DAY, Disp: 90 tablet, Rfl: 3     multivit-minerals/folic acid (CENTRUM ADULTS ORAL), as directed Orally, Disp: , Rfl:

## 2025-04-14 DIAGNOSIS — I10 BENIGN HYPERTENSION: ICD-10-CM

## 2025-04-14 RX ORDER — LISINOPRIL 20 MG/1
20 TABLET ORAL DAILY
Qty: 90 TABLET | Refills: 3 | Status: SHIPPED | OUTPATIENT
Start: 2025-04-14

## 2025-04-28 ENCOUNTER — OFFICE VISIT (OUTPATIENT)
Dept: PRIMARY CARE | Facility: CLINIC | Age: 80
End: 2025-04-28
Payer: MEDICARE

## 2025-04-28 VITALS
WEIGHT: 178.2 LBS | DIASTOLIC BLOOD PRESSURE: 56 MMHG | BODY MASS INDEX: 32.59 KG/M2 | OXYGEN SATURATION: 97 % | HEART RATE: 68 BPM | SYSTOLIC BLOOD PRESSURE: 102 MMHG

## 2025-04-28 DIAGNOSIS — H61.23 BILATERAL IMPACTED CERUMEN: ICD-10-CM

## 2025-04-28 DIAGNOSIS — I95.2 HYPOTENSION DUE TO DRUGS: Primary | ICD-10-CM

## 2025-04-28 PROCEDURE — 1159F MED LIST DOCD IN RCRD: CPT | Performed by: INTERNAL MEDICINE

## 2025-04-28 PROCEDURE — 3078F DIAST BP <80 MM HG: CPT | Performed by: INTERNAL MEDICINE

## 2025-04-28 PROCEDURE — 99214 OFFICE O/P EST MOD 30 MIN: CPT | Performed by: INTERNAL MEDICINE

## 2025-04-28 PROCEDURE — 1123F ACP DISCUSS/DSCN MKR DOCD: CPT | Performed by: INTERNAL MEDICINE

## 2025-04-28 PROCEDURE — 1126F AMNT PAIN NOTED NONE PRSNT: CPT | Performed by: INTERNAL MEDICINE

## 2025-04-28 PROCEDURE — 1157F ADVNC CARE PLAN IN RCRD: CPT | Performed by: INTERNAL MEDICINE

## 2025-04-28 PROCEDURE — 1036F TOBACCO NON-USER: CPT | Performed by: INTERNAL MEDICINE

## 2025-04-28 PROCEDURE — 3074F SYST BP LT 130 MM HG: CPT | Performed by: INTERNAL MEDICINE

## 2025-04-28 ASSESSMENT — PAIN SCALES - GENERAL: PAINLEVEL_OUTOF10: 0-NO PAIN

## 2025-04-28 NOTE — PROGRESS NOTES
Subjective   Patient ID: Nika Ricardo is a 79 y.o. female who presents for ears blocked.    Has been working on drinking more liquid-BP still low and she still feels off balance. BP drops when stands up and needed to sit down quickly         Review of Systems   HENT:  Positive for hearing loss (sound like she's under sea).        Objective   /56 (BP Location: Left arm, Patient Position: Sitting)   Pulse 68   Wt 80.8 kg (178 lb 3.2 oz)   LMP  (LMP Unknown)   SpO2 97%   BMI 32.59 kg/m²     Physical Exam  HENT:      Right Ear: There is impacted cerumen.      Left Ear: There is impacted cerumen.      Mouth/Throat:      Mouth: Mucous membranes are moist.         Assessment/Plan   Assessment & Plan  Hypotension due to drugs  Will stop lisinopril and check in few days       Bilateral impacted cerumen  Will use drops bilat and try irrigation at next visit

## 2025-05-02 ENCOUNTER — OFFICE VISIT (OUTPATIENT)
Dept: PRIMARY CARE | Facility: CLINIC | Age: 80
End: 2025-05-02
Payer: MEDICARE

## 2025-05-02 VITALS
WEIGHT: 178 LBS | SYSTOLIC BLOOD PRESSURE: 110 MMHG | HEART RATE: 78 BPM | OXYGEN SATURATION: 97 % | BODY MASS INDEX: 32.76 KG/M2 | HEIGHT: 62 IN | DIASTOLIC BLOOD PRESSURE: 72 MMHG

## 2025-05-02 DIAGNOSIS — H61.23 IMPACTED CERUMEN, BILATERAL: Primary | ICD-10-CM

## 2025-05-02 PROCEDURE — 69210 REMOVE IMPACTED EAR WAX UNI: CPT | Performed by: INTERNAL MEDICINE

## 2025-05-02 ASSESSMENT — PAIN SCALES - GENERAL: PAINLEVEL_OUTOF10: 0-NO PAIN

## 2025-05-02 NOTE — PROGRESS NOTES
"Subjective   Patient ID: Nika Ricardo is a 79 y.o. female who presents for ear blocked.    R ear still feels funny. Has been using drops           Objective   /72   Pulse 78   Ht 1.575 m (5' 2\")   Wt 80.7 kg (178 lb)   LMP  (LMP Unknown)   SpO2 97%   BMI 32.56 kg/m²     Physical Exam  HENT:      Right Ear: There is impacted cerumen.      Left Ear: There is impacted cerumen.       Assessment/Plan   Assessment & Plan  Impacted cerumen, bilateral  L 80% clear with irrigation; R removed outer soft wax but still with hard lump--will use drops for another week and follow up                               "

## 2025-05-08 ENCOUNTER — OFFICE VISIT (OUTPATIENT)
Dept: PRIMARY CARE | Facility: CLINIC | Age: 80
End: 2025-05-08
Payer: MEDICARE

## 2025-05-08 VITALS
HEART RATE: 74 BPM | SYSTOLIC BLOOD PRESSURE: 138 MMHG | OXYGEN SATURATION: 97 % | WEIGHT: 175.4 LBS | DIASTOLIC BLOOD PRESSURE: 72 MMHG | BODY MASS INDEX: 32.08 KG/M2

## 2025-05-08 DIAGNOSIS — I67.89 CEREBRAL MICROVASCULAR DISEASE: Primary | ICD-10-CM

## 2025-05-08 PROCEDURE — 99213 OFFICE O/P EST LOW 20 MIN: CPT | Performed by: INTERNAL MEDICINE

## 2025-05-08 PROCEDURE — 1036F TOBACCO NON-USER: CPT | Performed by: INTERNAL MEDICINE

## 2025-05-08 PROCEDURE — 1125F AMNT PAIN NOTED PAIN PRSNT: CPT | Performed by: INTERNAL MEDICINE

## 2025-05-08 PROCEDURE — 3078F DIAST BP <80 MM HG: CPT | Performed by: INTERNAL MEDICINE

## 2025-05-08 PROCEDURE — 1159F MED LIST DOCD IN RCRD: CPT | Performed by: INTERNAL MEDICINE

## 2025-05-08 PROCEDURE — 3075F SYST BP GE 130 - 139MM HG: CPT | Performed by: INTERNAL MEDICINE

## 2025-05-08 PROCEDURE — G2211 COMPLEX E/M VISIT ADD ON: HCPCS | Performed by: INTERNAL MEDICINE

## 2025-05-08 RX ORDER — DONEPEZIL HYDROCHLORIDE 5 MG/1
5 TABLET, FILM COATED ORAL NIGHTLY
Qty: 30 TABLET | Refills: 5 | Status: SHIPPED | OUTPATIENT
Start: 2025-05-08 | End: 2025-11-04

## 2025-05-08 ASSESSMENT — PAIN SCALES - GENERAL: PAINLEVEL_OUTOF10: 2

## 2025-05-08 NOTE — ASSESSMENT & PLAN NOTE
Orders:    donepezil (Aricept) 5 mg tablet; Take 1 tablet (5 mg) by mouth once daily at bedtime.

## 2025-05-08 NOTE — PROGRESS NOTES
Subjective   Patient ID: Nika Ricardo is a 79 y.o. female who presents for follow up ear check.    Has been using drops and can hear better on R; L has been fine    Worried about her memory-we had previously given her aricept but she stopped it because didn't want to take more pills but also didn't have any problems with them. Agrees to restart it and keep follow up in July           Objective   /72 (BP Location: Left arm, Patient Position: Sitting)   Pulse 74   Wt 79.6 kg (175 lb 6.4 oz)   LMP  (LMP Unknown)   SpO2 97%   BMI 32.08 kg/m²     Physical Exam L ear clear; R ear able to remove another layer of soft wax and move hard lumpt-she reports being able to hear    Assessment/Plan   Assessment & Plan  Cerebral microvascular disease    Orders:    donepezil (Aricept) 5 mg tablet; Take 1 tablet (5 mg) by mouth once daily at bedtime.

## 2025-06-02 ENCOUNTER — OFFICE VISIT (OUTPATIENT)
Dept: DERMATOLOGY | Facility: CLINIC | Age: 80
End: 2025-06-02
Payer: MEDICARE

## 2025-06-02 DIAGNOSIS — L82.1 SEBORRHEIC KERATOSIS: ICD-10-CM

## 2025-06-02 DIAGNOSIS — Z85.828 HISTORY OF NONMELANOMA SKIN CANCER: ICD-10-CM

## 2025-06-02 DIAGNOSIS — L90.5 SCAR CONDITIONS AND FIBROSIS OF SKIN: ICD-10-CM

## 2025-06-02 DIAGNOSIS — L57.9 SKIN CHANGES DUE TO CHRONIC EXPOSURE TO NONIONIZING RADIATION: ICD-10-CM

## 2025-06-02 DIAGNOSIS — L81.4 LENTIGO: ICD-10-CM

## 2025-06-02 DIAGNOSIS — L71.9 ROSACEA: ICD-10-CM

## 2025-06-02 DIAGNOSIS — D22.9 BENIGN NEVUS: Primary | ICD-10-CM

## 2025-06-02 DIAGNOSIS — L57.0 ACTINIC KERATOSIS: ICD-10-CM

## 2025-06-02 PROCEDURE — 99213 OFFICE O/P EST LOW 20 MIN: CPT | Performed by: NURSE PRACTITIONER

## 2025-06-02 PROCEDURE — 1159F MED LIST DOCD IN RCRD: CPT | Performed by: NURSE PRACTITIONER

## 2025-06-02 PROCEDURE — 1036F TOBACCO NON-USER: CPT | Performed by: NURSE PRACTITIONER

## 2025-06-02 PROCEDURE — 1160F RVW MEDS BY RX/DR IN RCRD: CPT | Performed by: NURSE PRACTITIONER

## 2025-06-02 RX ORDER — METRONIDAZOLE 7.5 MG/G
CREAM TOPICAL
Qty: 45 G | Refills: 2 | Status: SHIPPED | OUTPATIENT
Start: 2025-06-02

## 2025-06-02 NOTE — Clinical Note
WHAT IS ACTINIC KERATOSIS?   - Actinic keratosis (AK) is a skin condition caused by sun damage. It causes scaly, rough, or bumpy spots on the skin.  - If left alone, AKs may turn into a skin cancer. People who burn easily or have trouble tanning are at more risk for developing AKs.   - There is no one test for AKs and diagnosis is made by clinical appearance. Treatment options include cryotherapy, therapy with lights, and various creams (e.g., topical 5-fluorocuracil, imiquimod).       To lower the chance of getting AK, you can:       ?  Stay out of the sun in the middle of the day (from 10 a.m. to 4 p.m.)       ?  Wear sunscreen - An SPF of at least 30 is best. The SPF number is on the sunscreen bottle or tube.       ?  Wear a wide-brimmed hat, long-sleeved shirt, long pants, or long skirt outside. A baseball hat does not give much protection.        ?  Do not use tanning beds.        ?  Keep a low-fat diet, less than 21% of calories should come from fat       ?  Take Vitamin B3 (nicotinomide) 500mg twice daily.      YOUR TREATMENT PLAN  - At this time I recommend treatment with cryotherapy.  - Possible side effects of liquid nitrogen treatment reviewed including formation of blisters, crusting, tenderness, scar, and discoloration which may be permanent. She wants to hold on treatment at this time and wants to proceed with treatment at follow up.

## 2025-06-02 NOTE — Clinical Note
Endorses flushing more recently. Warm water or stress. Red papules have started more recently and are coming and going. Reports sometimes to be flaky. DDx: Rosacea vs less likely seb derm (no dandruff noted, NLF/T zone spared).     -We reviewed the etiology of rosacea in detail.  Rosacea is an inflammatory disorder of the face that causes erythema, telangiectasias, as well as papules and pustules.  The exact cause is unknown however it is likely due to a combination of environmental and hereditary factors.  Rosacea can occur in any individual however it is more common in middle aged women.  Rosacea has a waxing and waning course and is characterized by recurrent flares.  Common triggers include hot foods or beverages, spicy foods, alcohol, temperature extremes, sunlight, stress, anger or embarrassment, strenuous exercise, hot baths or saunas, and corticosteroids, both topical and systemic.    Identify and minimize any exposure that triggers episodes of rosacea:  Use a broad-spectrum sunscreen with a sun protection factor (SPF) of 30 or more on your face.  Avoid your triggers, which may include drinking hot liquids and alcohol, eating spicy foods, and excessive heat exposure.  Protect your face in cold weather with a scarf or mask.  Avoid facial products with alcohol or other skin irritants (such as astringents, toners, sorbic acid, menthol, and camphor), and use mild cleansers for the face.  Green- or yellow-tinted makeup can help hide redness.  Cool compresses and gel masks may be of some benefit.    Plan  Metrocream BID

## 2025-06-02 NOTE — PROGRESS NOTES
Nishant Ricardo is a 79 y.o. female who presents for the following: Rash (Dry skin on nose.).     Review of Systems:  No other skin or systemic complaints other than what is documented elsewhere in the note.    The following portions of the chart were reviewed this encounter and updated as appropriate:   Tobacco  Allergies  Meds  Problems  Med Hx  Surg Hx         Skin Cancer History  Biopsy Log Book  No skin cancers from Specimen Tracking.    Additional History      Specialty Problems          Dermatology Problems    Epidermal cyst    Rosacea        Objective   Well appearing patient in no apparent distress; mood and affect are within normal limits.    A focused skin examination was performed neck up. All findings within normal limits unless otherwise noted below.    Assessment/Plan   Skin Exam  1. BENIGN NEVUS  Generalized  Scattered, uniform and benign-appearing, regular brown melanocytic papules and macules.  The present appearance of the lesion is not worrisome but it should continue to be observed and testing/treatment may be warranted if change occurs.  2. SEBORRHEIC KERATOSIS  Generalized  Stuck on verrucous, tan-brown papules and plaques.    Seborrheic keratoses are common noncancerous (benign) growths of unknown cause seen in adults due to a thickening of an area of the top skin layer. Seborrheic keratoses may appear as if they are stuck on to the skin. They have distinct borders, and they may appear as papules (small, solid bumps) or plaques (solid, raised patches that are bigger than a thumbnail). They may be the same color as your skin, or they may be pink, light brown, darker brown, or very dark brown, or sometimes may appear black.    There is no way to prevent new seborrheic keratoses from forming. Seborrheic keratoses can be removed, but removal is considered a cosmetic issue and is usually not covered by insurance.    PLAN  No treatment is needed unless there is irritation from  clothing, such as itching or bleeding.  2.   Some lotions containing alpha hydroxy acids, salicylic acid, or urea may make the areas feel smoother with regular use but will not eliminate them.  3. LENTIGO  Generalized  Scattered tan macules in sun-exposed areas.  A solar lentigo (plural, solar lentigines), also known as a sun-induced freckle or senile lentigo, is a dark (hyperpigmented) lesion caused by natural or artificial ultraviolet (UV) light. Solar lentigines may be single or multiple. This type of lentigo is different from a simple lentigo (lentigo simplex) because it is caused by exposure to UV light. Solar lentigines are benign, but they do indicate excessive sun exposure, a risk factor for the development of skin cancer.    To prevent solar lentigines, avoid exposure to sunlight in midday (10 AM to 3 PM), wear sun-protective clothing (tightly woven clothes and hats), and apply sunscreen (SPF 30 UVA and UVB block).    The present appearance of the lesion is not worrisome but it should continue to be observed and testing/treatment may be warranted if change occurs.  4. SCAR CONDITIONS AND FIBROSIS OF SKIN  Right Upper Cutaneous Lip  Well healed scar at the site(s) of prior treatment with no evidence of recurrence.        The scar is clear, there is no evidence of recurrence.  The present appearance of the scar is not worrisome but it should continue to be observed and testing/treatment may be warranted if change occurs.    5. HISTORY OF NONMELANOMA SKIN CANCER  Generalized  ABCDEs of melanoma and atypical moles were discussed with the patient.    Patient was instructed to perform monthly self skin examination.  We recommended that the patient have regular full skin exams given an increased risk of subsequent skin cancers.    The patient was instructed to use sun protective behaviors including use of broad spectrum sunscreens and sun protective clothing to reduce risk of skin cancers.    Warning signs of  non-melanoma skin cancer discussed.  6. SKIN CHANGES DUE TO CHRONIC EXPOSURE TO NONIONIZING RADIATION  Generalized  Actinic changes in the form of freckles, lentigines and hyper/hypopigmentation   ABCDEs of melanoma and atypical moles were discussed with the patient.    Patient was instructed to perform monthly self skin examination.  We recommended that the patient have regular full skin exams given an increased risk of subsequent skin cancers.    The patient was instructed to use sun protective behaviors including use of broad spectrum sunscreens and sun protective clothing to reduce risk of skin cancers.    Warning signs of non-melanoma skin cancer discussed.  7. ACTINIC KERATOSIS  Left [place]  Thin erythematous papule with gritty scale  WHAT IS ACTINIC KERATOSIS?   - Actinic keratosis (AK) is a skin condition caused by sun damage. It causes scaly, rough, or bumpy spots on the skin.  - If left alone, AKs may turn into a skin cancer. People who burn easily or have trouble tanning are at more risk for developing AKs.   - There is no one test for AKs and diagnosis is made by clinical appearance. Treatment options include cryotherapy, therapy with lights, and various creams (e.g., topical 5-fluorocuracil, imiquimod).       To lower the chance of getting AK, you can:       ?  Stay out of the sun in the middle of the day (from 10 a.m. to 4 p.m.)       ?  Wear sunscreen - An SPF of at least 30 is best. The SPF number is on the sunscreen bottle or tube.       ?  Wear a wide-brimmed hat, long-sleeved shirt, long pants, or long skirt outside. A baseball hat does not give much protection.        ?  Do not use tanning beds.        ?  Keep a low-fat diet, less than 21% of calories should come from fat       ?  Take Vitamin B3 (nicotinomide) 500mg twice daily.      YOUR TREATMENT PLAN  - At this time I recommend treatment with cryotherapy.  - Possible side effects of liquid nitrogen treatment reviewed including formation of  blisters, crusting, tenderness, scar, and discoloration which may be permanent. She wants to hold on treatment at this time and wants to proceed with treatment at follow up.   8. ROSACEA  Head - Anterior (Face)  Mid face with very faint erythema with telangiectasias and scattered inflammatory papules to the nose and left medial cheek area.   Endorses flushing more recently. Warm water or stress. Red papules have started more recently and are coming and going. Reports sometimes to be flaky. DDx: Rosacea vs less likely seb derm (no dandruff noted, NLF/T zone spared).     -We reviewed the etiology of rosacea in detail.  Rosacea is an inflammatory disorder of the face that causes erythema, telangiectasias, as well as papules and pustules.  The exact cause is unknown however it is likely due to a combination of environmental and hereditary factors.  Rosacea can occur in any individual however it is more common in middle aged women.  Rosacea has a waxing and waning course and is characterized by recurrent flares.  Common triggers include hot foods or beverages, spicy foods, alcohol, temperature extremes, sunlight, stress, anger or embarrassment, strenuous exercise, hot baths or saunas, and corticosteroids, both topical and systemic.    Identify and minimize any exposure that triggers episodes of rosacea:  Use a broad-spectrum sunscreen with a sun protection factor (SPF) of 30 or more on your face.  Avoid your triggers, which may include drinking hot liquids and alcohol, eating spicy foods, and excessive heat exposure.  Protect your face in cold weather with a scarf or mask.  Avoid facial products with alcohol or other skin irritants (such as astringents, toners, sorbic acid, menthol, and camphor), and use mild cleansers for the face.  Green- or yellow-tinted makeup can help hide redness.  Cool compresses and gel masks may be of some benefit.    Plan  Metrocream BID     Related Medications  metroNIDAZOLE (Metrocream) 0.75  % cream  Apply to affected areas twice daily    Return in 2 months for recheck of rosacea and LN2 for AK.

## 2025-06-02 NOTE — PATIENT INSTRUCTIONS

## 2025-06-02 NOTE — Clinical Note
Mid face with very faint erythema with telangiectasias and scattered inflammatory papules to the nose and left medial cheek area.

## 2025-06-02 NOTE — Clinical Note
Prep Survey      Flowsheet Row Responses   [facility] facility patient discharged from? [facility]   Is LACE score < 7 ? No   Eligibility Readm Mgmt   Discharge diagnosis Sepsis   Does the patient have one of the following disease processes/diagnoses(primary or secondary)? Sepsis   Does the patient have Home health ordered? No   Is there a DME ordered? No   Prep survey completed? Yes            Karla RENDON - Registered Nurse           Scattered, uniform and benign-appearing, regular brown melanocytic papules and macules.

## 2025-06-04 ENCOUNTER — APPOINTMENT (OUTPATIENT)
Dept: DERMATOLOGY | Facility: CLINIC | Age: 80
End: 2025-06-04
Payer: MEDICARE

## 2025-06-07 ENCOUNTER — APPOINTMENT (OUTPATIENT)
Dept: RADIOLOGY | Facility: HOSPITAL | Age: 80
End: 2025-06-07
Payer: MEDICARE

## 2025-06-07 ENCOUNTER — OFFICE VISIT (OUTPATIENT)
Dept: URGENT CARE | Age: 80
End: 2025-06-07
Payer: MEDICARE

## 2025-06-07 ENCOUNTER — APPOINTMENT (OUTPATIENT)
Dept: CARDIOLOGY | Facility: HOSPITAL | Age: 80
End: 2025-06-07
Payer: MEDICARE

## 2025-06-07 ENCOUNTER — HOSPITAL ENCOUNTER (EMERGENCY)
Facility: HOSPITAL | Age: 80
Discharge: HOME | End: 2025-06-07
Attending: STUDENT IN AN ORGANIZED HEALTH CARE EDUCATION/TRAINING PROGRAM
Payer: MEDICARE

## 2025-06-07 VITALS
HEART RATE: 65 BPM | WEIGHT: 175 LBS | DIASTOLIC BLOOD PRESSURE: 86 MMHG | HEIGHT: 62 IN | TEMPERATURE: 97.7 F | SYSTOLIC BLOOD PRESSURE: 157 MMHG | BODY MASS INDEX: 32.2 KG/M2

## 2025-06-07 VITALS
HEART RATE: 63 BPM | SYSTOLIC BLOOD PRESSURE: 183 MMHG | HEIGHT: 62 IN | DIASTOLIC BLOOD PRESSURE: 92 MMHG | BODY MASS INDEX: 32.2 KG/M2 | OXYGEN SATURATION: 98 % | TEMPERATURE: 97.2 F | WEIGHT: 175 LBS | RESPIRATION RATE: 16 BRPM

## 2025-06-07 DIAGNOSIS — R42 VERTIGO: ICD-10-CM

## 2025-06-07 DIAGNOSIS — I10 HYPERTENSION, UNSPECIFIED TYPE: Primary | ICD-10-CM

## 2025-06-07 DIAGNOSIS — R41.0 CONFUSION: ICD-10-CM

## 2025-06-07 DIAGNOSIS — R42 DIZZINESS: Primary | ICD-10-CM

## 2025-06-07 DIAGNOSIS — I10 HYPERTENSION, UNSPECIFIED TYPE: ICD-10-CM

## 2025-06-07 DIAGNOSIS — H61.20 IMPACTED CERUMEN, UNSPECIFIED LATERALITY: ICD-10-CM

## 2025-06-07 LAB
ALBUMIN SERPL BCP-MCNC: 4.4 G/DL (ref 3.4–5)
ALP SERPL-CCNC: 47 U/L (ref 33–136)
ALT SERPL W P-5'-P-CCNC: 18 U/L (ref 7–45)
ANION GAP SERPL CALCULATED.3IONS-SCNC: 9 MMOL/L (ref 10–20)
APPEARANCE UR: CLEAR
AST SERPL W P-5'-P-CCNC: 17 U/L (ref 9–39)
BASOPHILS # BLD AUTO: 0.02 X10*3/UL (ref 0–0.1)
BASOPHILS NFR BLD AUTO: 0.3 %
BILIRUB SERPL-MCNC: 0.5 MG/DL (ref 0–1.2)
BILIRUB UR STRIP.AUTO-MCNC: NEGATIVE MG/DL
BUN SERPL-MCNC: 15 MG/DL (ref 6–23)
CALCIUM SERPL-MCNC: 10.1 MG/DL (ref 8.6–10.3)
CARDIAC TROPONIN I PNL SERPL HS: 3 NG/L (ref 0–13)
CHLORIDE SERPL-SCNC: 105 MMOL/L (ref 98–107)
CO2 SERPL-SCNC: 29 MMOL/L (ref 21–32)
COLOR UR: ABNORMAL
CREAT SERPL-MCNC: 0.72 MG/DL (ref 0.5–1.05)
EGFRCR SERPLBLD CKD-EPI 2021: 85 ML/MIN/1.73M*2
EOSINOPHIL # BLD AUTO: 0.01 X10*3/UL (ref 0–0.4)
EOSINOPHIL NFR BLD AUTO: 0.1 %
ERYTHROCYTE [DISTWIDTH] IN BLOOD BY AUTOMATED COUNT: 12.8 % (ref 11.5–14.5)
GLUCOSE SERPL-MCNC: 85 MG/DL (ref 74–99)
GLUCOSE UR STRIP.AUTO-MCNC: NORMAL MG/DL
HCT VFR BLD AUTO: 39.3 % (ref 36–46)
HGB BLD-MCNC: 13 G/DL (ref 12–16)
IMM GRANULOCYTES # BLD AUTO: 0.07 X10*3/UL (ref 0–0.5)
IMM GRANULOCYTES NFR BLD AUTO: 1 % (ref 0–0.9)
KETONES UR STRIP.AUTO-MCNC: NEGATIVE MG/DL
LEUKOCYTE ESTERASE UR QL STRIP.AUTO: NEGATIVE
LYMPHOCYTES # BLD AUTO: 1.41 X10*3/UL (ref 0.8–3)
LYMPHOCYTES NFR BLD AUTO: 20.1 %
MCH RBC QN AUTO: 29.6 PG (ref 26–34)
MCHC RBC AUTO-ENTMCNC: 33.1 G/DL (ref 32–36)
MCV RBC AUTO: 90 FL (ref 80–100)
MONOCYTES # BLD AUTO: 2.62 X10*3/UL (ref 0.05–0.8)
MONOCYTES NFR BLD AUTO: 37.4 %
NEUTROPHILS # BLD AUTO: 2.87 X10*3/UL (ref 1.6–5.5)
NEUTROPHILS NFR BLD AUTO: 41.1 %
NITRITE UR QL STRIP.AUTO: NEGATIVE
NRBC BLD-RTO: 0 /100 WBCS (ref 0–0)
PH UR STRIP.AUTO: 6.5 [PH]
PLATELET # BLD AUTO: 178 X10*3/UL (ref 150–450)
POTASSIUM SERPL-SCNC: 4.6 MMOL/L (ref 3.5–5.3)
PROT SERPL-MCNC: 8.3 G/DL (ref 6.4–8.2)
PROT UR STRIP.AUTO-MCNC: NEGATIVE MG/DL
RBC # BLD AUTO: 4.39 X10*6/UL (ref 4–5.2)
RBC # UR STRIP.AUTO: ABNORMAL MG/DL
RBC #/AREA URNS AUTO: NORMAL /HPF
SODIUM SERPL-SCNC: 138 MMOL/L (ref 136–145)
SP GR UR STRIP.AUTO: 1.02
SQUAMOUS #/AREA URNS AUTO: NORMAL /HPF
UROBILINOGEN UR STRIP.AUTO-MCNC: NORMAL MG/DL
WBC # BLD AUTO: 7 X10*3/UL (ref 4.4–11.3)
WBC #/AREA URNS AUTO: NORMAL /HPF

## 2025-06-07 PROCEDURE — 93005 ELECTROCARDIOGRAM TRACING: CPT

## 2025-06-07 PROCEDURE — 2500000004 HC RX 250 GENERAL PHARMACY W/ HCPCS (ALT 636 FOR OP/ED): Performed by: PHYSICIAN ASSISTANT

## 2025-06-07 PROCEDURE — 71046 X-RAY EXAM CHEST 2 VIEWS: CPT

## 2025-06-07 PROCEDURE — 80053 COMPREHEN METABOLIC PANEL: CPT | Performed by: PHYSICIAN ASSISTANT

## 2025-06-07 PROCEDURE — 70450 CT HEAD/BRAIN W/O DYE: CPT

## 2025-06-07 PROCEDURE — 99285 EMERGENCY DEPT VISIT HI MDM: CPT | Mod: 25 | Performed by: STUDENT IN AN ORGANIZED HEALTH CARE EDUCATION/TRAINING PROGRAM

## 2025-06-07 PROCEDURE — 36415 COLL VENOUS BLD VENIPUNCTURE: CPT | Performed by: PHYSICIAN ASSISTANT

## 2025-06-07 PROCEDURE — 85025 COMPLETE CBC W/AUTO DIFF WBC: CPT | Performed by: PHYSICIAN ASSISTANT

## 2025-06-07 PROCEDURE — 84484 ASSAY OF TROPONIN QUANT: CPT | Performed by: PHYSICIAN ASSISTANT

## 2025-06-07 PROCEDURE — 2500000002 HC RX 250 W HCPCS SELF ADMINISTERED DRUGS (ALT 637 FOR MEDICARE OP, ALT 636 FOR OP/ED): Performed by: PHYSICIAN ASSISTANT

## 2025-06-07 PROCEDURE — 71046 X-RAY EXAM CHEST 2 VIEWS: CPT | Performed by: RADIOLOGY

## 2025-06-07 PROCEDURE — 81001 URINALYSIS AUTO W/SCOPE: CPT | Performed by: PHYSICIAN ASSISTANT

## 2025-06-07 PROCEDURE — 70450 CT HEAD/BRAIN W/O DYE: CPT | Performed by: STUDENT IN AN ORGANIZED HEALTH CARE EDUCATION/TRAINING PROGRAM

## 2025-06-07 RX ORDER — MECLIZINE HYDROCHLORIDE 25 MG/1
50 TABLET ORAL ONCE
Status: COMPLETED | OUTPATIENT
Start: 2025-06-07 | End: 2025-06-07

## 2025-06-07 RX ORDER — MECLIZINE HYDROCHLORIDE 25 MG/1
25 TABLET ORAL 3 TIMES DAILY PRN
Qty: 20 TABLET | Refills: 0 | Status: SHIPPED | OUTPATIENT
Start: 2025-06-07

## 2025-06-07 RX ORDER — ONDANSETRON 4 MG/1
4 TABLET, ORALLY DISINTEGRATING ORAL ONCE
Status: COMPLETED | OUTPATIENT
Start: 2025-06-07 | End: 2025-06-07

## 2025-06-07 RX ADMIN — ONDANSETRON 4 MG: 4 TABLET, ORALLY DISINTEGRATING ORAL at 15:10

## 2025-06-07 RX ADMIN — MECLIZINE HYDROCHLORIDE 50 MG: 25 TABLET ORAL at 15:10

## 2025-06-07 ASSESSMENT — LIFESTYLE VARIABLES
TOTAL SCORE: 0
HAVE YOU EVER FELT YOU SHOULD CUT DOWN ON YOUR DRINKING: NO
EVER FELT BAD OR GUILTY ABOUT YOUR DRINKING: NO
EVER HAD A DRINK FIRST THING IN THE MORNING TO STEADY YOUR NERVES TO GET RID OF A HANGOVER: NO
HAVE PEOPLE ANNOYED YOU BY CRITICIZING YOUR DRINKING: NO

## 2025-06-07 ASSESSMENT — ENCOUNTER SYMPTOMS: DIZZINESS: 1

## 2025-06-07 ASSESSMENT — PAIN SCALES - GENERAL: PAINLEVEL_OUTOF10: 0 - NO PAIN

## 2025-06-07 ASSESSMENT — PAIN - FUNCTIONAL ASSESSMENT: PAIN_FUNCTIONAL_ASSESSMENT: 0-10

## 2025-06-07 ASSESSMENT — COLUMBIA-SUICIDE SEVERITY RATING SCALE - C-SSRS
6. HAVE YOU EVER DONE ANYTHING, STARTED TO DO ANYTHING, OR PREPARED TO DO ANYTHING TO END YOUR LIFE?: NO
1. IN THE PAST MONTH, HAVE YOU WISHED YOU WERE DEAD OR WISHED YOU COULD GO TO SLEEP AND NOT WAKE UP?: NO
2. HAVE YOU ACTUALLY HAD ANY THOUGHTS OF KILLING YOURSELF?: NO

## 2025-06-07 NOTE — ED TRIAGE NOTES
Pt has htn, was on bp meds.  took her off meds two months ago. Presented to urgent care for dizziness, and  blurry vision. Denies pain.

## 2025-06-07 NOTE — ED PROVIDER NOTES
Supervisory note:  Patient seen in conjunction with RASHEED Antonio.  Patient presents with dizziness as well as high blood pressure.  She went to urgent care earlier today and was found to have very elevated blood pressure and was sent to the emergency department.  Patient reports that she had previously been on lisinopril but due to symptoms of dizziness with standing, her primary care physician took her off of her lisinopril.  On examination, patient is awake and alert, answers questions appropriately.  She does sometimes lose her train of thought, which patient states has been normal for her.  NIH stroke scale is 0.  With negative test of skew and no truncal ataxia, and patient ambulating in the emergency department without difficulty, my suspicion for posterior CVA as etiology of symptoms is very low.  Laboratory studies are without acute findings.  Patient was treated with meclizine.  Patient given prescription for meclizine and was advised to follow-up with primary care physician.  Return precautions given for any worsening symptoms.  I personally saw the patient and made/approved the management plan and take responsiblity for the patient management.  Parts of this chart were completed with dictation software, please excuse any errors in transcription.     Cosmo Jiménez MD  06/07/25 [id number]

## 2025-06-07 NOTE — ED PROVIDER NOTES
HPI   No chief complaint on file.      79-year-old female presented emergency department with a chief complaint of several days of elevated blood pressure, lightheadedness, feeling somewhat off balance intermittently with position change.  Had lisinopril discontinued recently secondary to hypotension.  Went to urgent care was referred to the emergency department.  Has had similar symptoms with excess cerumen previously.  Denies any fall or injury.  No other complaint.              Patient History   Medical History[1]  Surgical History[2]  Family History[3]  Social History[4]    Physical Exam   ED Triage Vitals   Temperature Heart Rate Resp BP   06/07/25 1306 06/07/25 1306 -- 06/07/25 1308   36.5 °C (97.7 °F) 65  157/86      SpO2 Temp Source Heart Rate Source Patient Position   -- 06/07/25 1306 -- --    Temporal        BP Location FiO2 (%)     -- --             Physical Exam  Vitals and nursing note reviewed.   Constitutional:       Appearance: Normal appearance.   HENT:      Head: Normocephalic and atraumatic.      Mouth/Throat:      Mouth: Mucous membranes are moist.   Cardiovascular:      Rate and Rhythm: Normal rate and regular rhythm.      Pulses: Normal pulses.      Heart sounds: Normal heart sounds.   Pulmonary:      Effort: Pulmonary effort is normal.      Breath sounds: Normal breath sounds.   Abdominal:      General: Abdomen is flat.      Palpations: Abdomen is soft.   Musculoskeletal:         General: Normal range of motion.      Cervical back: Normal range of motion.   Skin:     General: Skin is warm.   Neurological:      General: No focal deficit present.      Mental Status: She is alert and oriented to person, place, and time.   Psychiatric:         Mood and Affect: Mood normal.         Behavior: Behavior normal.           ED Course & MDM   Diagnoses as of 06/07/25 1639   Hypertension, unspecified type   Vertigo   Impacted cerumen, unspecified laterality                 No data recorded     Jenna Coma  Scale Score: 15 (06/07/25 1307 : Rick Pearl RN)                           Medical Decision Making  I have seen and evaluated this patient.  The attending physician has also seen and evaluated this patient.  Vital signs, laboratory testing and diagnostic images if applicable have been reviewed.  All laboratory and imaging is interpreted by myself unless otherwise stated.  Radiology studies are also formally interpreted by radiologist.     CBC without significant leukocytosis or anemia, metabolic panel without significant renal impairment or electrolyte abnormality.  Troponin within an appropriate range without significant delta change, chest x-ray without actionable cardiopulmonary process, EKG without evidence of acute ischemia.  Improved in emergency department after medication and suspect this is a peripheral vertigo related to patient's cerumen impaction.  Released in stable condition    Labs Reviewed  CBC WITH AUTO DIFFERENTIAL - Abnormal     WBC                           7.0                    nRBC                          0.0                    RBC                           4.39                   Hemoglobin                    13.0                   Hematocrit                    39.3                   MCV                           90                     MCH                           29.6                   MCHC                          33.1                   RDW                           12.8                   Platelets                     178                    Neutrophils %                 41.1                   Immature Granulocytes %, Automated   1.0 (*)                Lymphocytes %                 20.1                   Monocytes %                   37.4                   Eosinophils %                 0.1                    Basophils %                   0.3                    Neutrophils Absolute          2.87                   Immature Granulocytes Absolute, Au*   0.07                   Lymphocytes  Absolute          1.41                   Monocytes Absolute            2.62 (*)               Eosinophils Absolute          0.01                   Basophils Absolute            0.02                COMPREHENSIVE METABOLIC PANEL - Abnormal     Glucose                       85                     Sodium                        138                    Potassium                     4.6                    Chloride                      105                    Bicarbonate                   29                     Anion Gap                     9 (*)                  Urea Nitrogen                 15                     Creatinine                    0.72                   eGFR                          85                     Calcium                       10.1                   Albumin                       4.4                    Alkaline Phosphatase          47                     Total Protein                 8.3 (*)                AST                           17                     Bilirubin, Total              0.5                    ALT                           18                  URINALYSIS WITH REFLEX CULTURE AND MICROSCOPIC - Abnormal     Color, Urine                                         Appearance, Urine             Clear                  Specific Gravity, Urine       1.018                  pH, Urine                     6.5                    Protein, Urine                NEGATIVE                Glucose, Urine                Normal                 Blood, Urine                    (*)                  Ketones, Urine                NEGATIVE                Bilirubin, Urine              NEGATIVE                Urobilinogen, Urine           Normal                 Nitrite, Urine                NEGATIVE                Leukocyte Esterase, Urine     NEGATIVE             SERIAL TROPONIN-INITIAL - Normal     Troponin I, High Sensitivity   3                        Narrative: Less than 99th percentile of normal range cutoff-                 Female and children under 18 years old <14 ng/L; Male <21 ng/L: Negative                Repeat testing should be performed if clinically indicated.                                 Female and children under 18 years old 14-50 ng/L; Male 21-50 ng/L:                Consistent with possible cardiac damage and possible increased clinical                 risk. Serial measurements may help to assess extent of myocardial damage.                                 >50 ng/L: Consistent with cardiac damage, increased clinical risk and                myocardial infarction. Serial measurements may help assess extent of                 myocardial damage.                                  NOTE: Children less than 1 year old may have higher baseline troponin                 levels and results should be interpreted in conjunction with the overall                 clinical context.                                 NOTE: Troponin I testing is performed using a different                 testing methodology at [facility] than at other                 [facility]. Direct result comparisons should only                 be made within the same method.  TROPONIN SERIES- (INITIAL, 1 HR)       Narrative: The following orders were created for panel order Troponin I Series, High Sensitivity (0, 1 HR).                Procedure                               Abnormality         Status                                   ---------                               -----------         ------                                   Troponin I, High Sensiti...[[id number]]  Normal              Final result                             Troponin, High Sensitivi...[[id number]]                                                                               Please view results for these tests on the individual orders.  URINALYSIS WITH REFLEX CULTURE AND MICROSCOPIC       Narrative: The following orders were created for panel order Urinalysis with Reflex  Culture and Microscopic.                Procedure                               Abnormality         Status                                   ---------                               -----------         ------                                   Urinalysis with Reflex C...[[id number]]  Abnormal            Final result                             Extra Urine Gray Tube[[id number]]                            In process                                               Please view results for these tests on the individual orders.  EXTRA URINE GRAY TUBE  SERIAL TROPONIN, 1 HOUR  URINALYSIS MICROSCOPIC WITH REFLEX CULTURE  CT head wo IV contrast   Final Result    No CT evidence of acute intracranial abnormality.          Chronic senescent changes including white matter disease commonly    attributed to chronic microvascular ischemia and age-appropriate    volume loss.                      MACRO:    None          Signed by: Nathaniel Osorio 6/7/2025 2:30 PM    Dictation workstation:   [id number]     XR chest 2 views   Final Result    1.  No evidence of acute cardiopulmonary process.                      MACRO:    None          Signed by: Rohith Manjarrez 6/7/2025 2:09 PM    Dictation workstation:   [id number]     Medications  carbamide peroxide (Debrox) 6.5 % otic solution 5 drop (has no administration in time range)  meclizine (Antivert) tablet 50 mg (50 mg oral Given 6/7/25 1510)  ondansetron ODT (Zofran-ODT) disintegrating tablet 4 mg (4 mg oral Given 6/7/25 1510)  New Prescriptions  CARBAMIDE PEROXIDE (DEBROX) 6.5 % OTIC SOLUTION     Administer 5 drops into affected ear(s) 2 times a day for 4 days.  MECLIZINE (ANTIVERT) 25 MG TABLET     Take 1 tablet (25 mg) by mouth 3 times a day as needed for dizziness for up to 20 doses.            Procedure  Procedures         [1]   Past Medical History:  Diagnosis Date    Disease of thyroid gland     Hypertension    [2] No past surgical history on file.  [3]   Family History  Problem  Relation Name Age of Onset    Other (frontal temporal dementia) Mother      Hypertension Mother      Mental illness Mother      Alzheimer's disease Father      Mental illness Father      COPD Brother      Dementia Mother's Brother      Dementia Father's Brother      Alzheimer's disease Paternal Grandmother      Other (Hardening of arteries) Paternal Grandmother     [4]   Social History  Tobacco Use    Smoking status: Never     Passive exposure: Never    Smokeless tobacco: Never   Vaping Use    Vaping status: Never Used   Substance Use Topics    Alcohol use: Yes    Drug use: Never        Jack Kohler PA-C  06/07/25 1596

## 2025-06-07 NOTE — PROGRESS NOTES
"Subjective   Patient ID: Nika Ricardo is a 79 y.o. female. They present today with a chief complaint of Dizziness (Dizziness, weakness and confusion since yesterday evening).    History of Present Illness  Patient is a pleasant 79-year-old white female, past medical Struve hypertension previously on lisinopril however has been taken off, hypothyroid, presenting to the clinic for chief complaint of dizziness and blood pressure check.  Patient states over the past couple of days she has had dizziness weakness and confusion.  States when she is talking she frequently loses her words and loses track of what she was thinking.  She states she feels off balance when she walks.  She denies any numbness tingling or focal weakness.  No chest pain or shortness of breath.  Denies any headache.  No double or blurry vision.  No further complaints.      Dizziness      Past Medical History  Allergies as of 06/07/2025 - Reviewed 06/02/2025   Allergen Reaction Noted    Penicillins Itching and Swelling 08/31/2023       Prescriptions Prior to Admission[1]       Medical History[2]    Surgical History[3]     reports that she has never smoked. She has never been exposed to tobacco smoke. She has never used smokeless tobacco. She reports current alcohol use. She reports that she does not use drugs.    Review of Systems  Review of Systems   Neurological:  Positive for dizziness.                                  Objective    Vitals:    06/07/25 1236   BP: (!) 183/92   Pulse: 63   Resp: 16   Temp: 36.2 °C (97.2 °F)   SpO2: 98%   Weight: 79.4 kg (175 lb)   Height: 1.575 m (5' 2\")     No LMP recorded (lmp unknown). Patient is postmenopausal.    Physical Exam  Constitutional: Alert, oriented, cooperative, in no acute distress. Appears well nourished and well hydrated.    Head: Normocephalic, atraumatic    Skin: Intact, dry skin. No lesions, rash, petechiae, or purpura.    Eyes: PERRL, EOMs intact, conjunctiva pink with no erythema or exudates. " No scleral icterus. Eyelids without lesions.    ENT: Hearing grossly intact. No external deformities. Tympanic membranes intact with visible landmarks. Nares patent, mucus membranes moist. Dentition without lesions. Pharynx clear, uvula midline.    Neck: Trachea midline, no lymphadenopathy. No meningismus.     Pulmonary: Lungs clear to auscultation bilaterally with good chest wall excursion. No rales, rhonchi, or wheezing. No accessory muscle use or stridor.     Cardiac: Regular rate and rhythm. Normal S1 and S2 with no murmur, rub, or gallop. No JVD, carotids without bruits. 2+ DP and PT pulses.     Abdomen: Soft, nontender, normoactive bowel sounds. No palpable organomegaly or mass. No rebound or guarding. No CVA tenderness.     Genitourinary: Exam deferred.    Musculoskeletal: Full range of motion in extremities. No pain, edema, or deformities. Peripheral pulses full and equal. No cyanosis, or clubbing.     Neurological: Cranial nerves II through XII are grossly intact. Normal sensation, no weakness, no focal findings identified.     Psychiatric: Appropriate mood and affect. Calm.    Procedures    Point of Care Test & Imaging Results from this visit    Imaging  No results found.    Cardiology, Vascular, and Other Imaging  No other imaging results found for the past 2 days      Diagnostic study results (if any) were reviewed by Lakhwinder óGmez PA-C.    Assessment/Plan   Allergies, medications, history, and pertinent labs/EKGs/Imaging reviewed by Lakhwinder Gómez PA-C.     Medical Decision Making  Patient was seen eval in the clinic for to complaint of dizziness and blood pressure check.  On exam patient is nontoxic-appearing resting comfortably no acute distress.  Vital signs are significant for an elevated blood pressure of 183/92.  Chest is clear, heart is regular, belly soft and nontender.  She is grossly neurovascular intact at this time with foot no focal deficits noted.  Pupils equal round reactive  to light with intact heels without nystagmus.  However, given patient's elevated blood pressure with complaints of dizziness confusion and weakness I feel she warrants further evaluation in the emergency department for further evaluation and assessment of her symptoms.  Patient does note that she has history of cerebrovascular disease.  I had a very similar conversation with the patient  at bedside regarding my impression plan and recent report to the ED and both expressed understanding and agreement this plan of care.  The refused EMS transport states he will drive across the street to [facility].  Patient's case was discussed with supervising physician.    Orders and Diagnoses  There are no diagnoses linked to this encounter.      Medical Admin Record      Follow Up Instructions  No follow-ups on file.    Patient disposition: ED    Electronically signed by Lakhwinder Gómez PA-C  12:43 PM         [1] (Not in a hospital admission)  [2]   Past Medical History:  Diagnosis Date    Disease of thyroid gland     Hypertension    [3] No past surgical history on file.

## 2025-06-08 LAB — HOLD SPECIMEN: NORMAL

## 2025-06-11 LAB
ATRIAL RATE: 59 BPM
P AXIS: 33 DEGREES
P OFFSET: 187 MS
P ONSET: 134 MS
PR INTERVAL: 192 MS
Q ONSET: 230 MS
QRS COUNT: 10 BEATS
QRS DURATION: 132 MS
QT INTERVAL: 446 MS
QTC CALCULATION(BAZETT): 441 MS
QTC FREDERICIA: 443 MS
R AXIS: 26 DEGREES
T AXIS: 18 DEGREES
T OFFSET: 453 MS
VENTRICULAR RATE: 59 BPM

## 2025-07-02 ENCOUNTER — OFFICE VISIT (OUTPATIENT)
Dept: PRIMARY CARE | Facility: CLINIC | Age: 80
End: 2025-07-02
Payer: MEDICARE

## 2025-07-02 VITALS
OXYGEN SATURATION: 97 % | SYSTOLIC BLOOD PRESSURE: 124 MMHG | DIASTOLIC BLOOD PRESSURE: 70 MMHG | WEIGHT: 176.4 LBS | HEART RATE: 62 BPM | BODY MASS INDEX: 32.26 KG/M2

## 2025-07-02 DIAGNOSIS — E06.3 HYPOTHYROIDISM DUE TO HASHIMOTO THYROIDITIS: ICD-10-CM

## 2025-07-02 DIAGNOSIS — K59.04 CHRONIC IDIOPATHIC CONSTIPATION: ICD-10-CM

## 2025-07-02 DIAGNOSIS — E55.9 VITAMIN D DEFICIENCY: ICD-10-CM

## 2025-07-02 DIAGNOSIS — H61.23 IMPACTED CERUMEN, BILATERAL: ICD-10-CM

## 2025-07-02 DIAGNOSIS — I10 BENIGN HYPERTENSION: Primary | ICD-10-CM

## 2025-07-02 DIAGNOSIS — M15.9 GENERALIZED OSTEOARTHRITIS: ICD-10-CM

## 2025-07-02 DIAGNOSIS — M54.16 LUMBAR RADICULOPATHY, CHRONIC: ICD-10-CM

## 2025-07-02 DIAGNOSIS — I67.89 CEREBRAL MICROVASCULAR DISEASE: ICD-10-CM

## 2025-07-02 DIAGNOSIS — R26.89 IMBALANCE: ICD-10-CM

## 2025-07-02 PROCEDURE — 69210 REMOVE IMPACTED EAR WAX UNI: CPT | Performed by: INTERNAL MEDICINE

## 2025-07-02 PROCEDURE — 1125F AMNT PAIN NOTED PAIN PRSNT: CPT | Performed by: INTERNAL MEDICINE

## 2025-07-02 PROCEDURE — 99214 OFFICE O/P EST MOD 30 MIN: CPT | Performed by: INTERNAL MEDICINE

## 2025-07-02 PROCEDURE — 3074F SYST BP LT 130 MM HG: CPT | Performed by: INTERNAL MEDICINE

## 2025-07-02 PROCEDURE — 1159F MED LIST DOCD IN RCRD: CPT | Performed by: INTERNAL MEDICINE

## 2025-07-02 PROCEDURE — 3078F DIAST BP <80 MM HG: CPT | Performed by: INTERNAL MEDICINE

## 2025-07-02 PROCEDURE — 1036F TOBACCO NON-USER: CPT | Performed by: INTERNAL MEDICINE

## 2025-07-02 RX ORDER — DONEPEZIL HYDROCHLORIDE 10 MG/1
10 TABLET, FILM COATED ORAL NIGHTLY
Qty: 90 TABLET | Refills: 3 | Status: SHIPPED | OUTPATIENT
Start: 2025-07-02 | End: 2025-12-29

## 2025-07-02 ASSESSMENT — ENCOUNTER SYMPTOMS
COUGH: 0
SHORTNESS OF BREATH: 0
DIARRHEA: 0
ARTHRALGIAS: 0
PSYCHIATRIC NEGATIVE: 1
ABDOMINAL PAIN: 0
DIZZINESS: 1
PALPITATIONS: 1
DYSURIA: 0
ACTIVITY CHANGE: 0
CONSTITUTIONAL NEGATIVE: 1
CONSTIPATION: 0

## 2025-07-02 ASSESSMENT — PAIN SCALES - GENERAL: PAINLEVEL_OUTOF10: 3

## 2025-07-02 NOTE — PROGRESS NOTES
Subjective   Patient ID: Nika Ricardo is a 79 y.o. female who presents for 3 month follow up .    Cerebral microvascular disease on CT scan with mild cognitive impairment; family history of dementia. Now taking aricept 5 mg    Hypertension-currently off meds due to hypotension    Hyperlipidemia-stable and compliant on meds. Taking atorvastatin-tolerating it so far. Lab Results       Component                Value               Date                       LDLCALC                  58                  12/07/2024               Hypothyroid with thyroiditis and goiter-stable on meds-Lab Results       Component                Value               Date                       TSH                      1.03                12/07/2024              Vitamin D def on supplements     Exercise-high intensity water exercise-5 mornings /week; walks as back allows    Went to  because was feeling funny (like a little off balance)--BP very high there so sent to ER. Work up there was negative and BP came down to normal. Sent her home with meclizine-has tried it but doesn't think it helps. Still feeling a little off balance. Feels great in the water. Ok if walks straight but off if turns           Review of Systems   Constitutional: Negative.  Negative for activity change.   Respiratory:  Negative for cough and shortness of breath.    Cardiovascular:  Positive for palpitations (occas and mild). Negative for chest pain and leg swelling.   Gastrointestinal:  Negative for abdominal pain, constipation (better with daily miralax) and diarrhea.   Genitourinary:  Negative for dysuria.   Musculoskeletal:  Negative for arthralgias.        Denies fall since last visit   Skin:  Negative for rash.   Neurological:  Positive for dizziness (brief if moves quickly--on discussion drinks very little water).        Word finding difficulty persists-little change yet   Psychiatric/Behavioral: Negative.         Objective   /70 (BP Location: Left arm,  Patient Position: Sitting)   Pulse 62   Wt 80 kg (176 lb 6.4 oz)   LMP  (LMP Unknown)   SpO2 97%   BMI 32.26 kg/m²     Physical Exam  Constitutional:       General: She is not in acute distress.     Appearance: Normal appearance.   HENT:      Right Ear: There is impacted cerumen.      Left Ear: There is impacted cerumen.   Cardiovascular:      Rate and Rhythm: Normal rate and regular rhythm.      Heart sounds: Normal heart sounds. No murmur heard.     No gallop.   Pulmonary:      Breath sounds: Normal breath sounds. No wheezing, rhonchi or rales.   Skin:     General: Skin is warm and dry.      Findings: No rash.   Neurological:      General: No focal deficit present.      Mental Status: She is alert and oriented to person, place, and time.      Comments: 3/3 short term memory test   Psychiatric:         Mood and Affect: Mood normal.      Comments: Nervous about all this         Assessment/Plan  will increase aricept and refer for PT for balance  Diagnoses and all orders for this visit:  Benign hypertension  Hypothyroidism due to Hashimoto thyroiditis  Vitamin D deficiency  Chronic idiopathic constipation  Generalized osteoarthritis  Cerebral microvascular disease  -     donepezil (Aricept) 10 mg tablet; Take 1 tablet (10 mg) by mouth once daily at bedtime.  Lumbar radiculopathy, chronic  Imbalance  -     Referral to Physical Therapy; Future  Impacted cerumen, bilateral

## 2025-07-22 ENCOUNTER — EVALUATION (OUTPATIENT)
Dept: PHYSICAL THERAPY | Facility: CLINIC | Age: 80
End: 2025-07-22
Payer: MEDICARE

## 2025-07-22 DIAGNOSIS — R26.89 IMBALANCE: Primary | ICD-10-CM

## 2025-07-22 PROCEDURE — 97110 THERAPEUTIC EXERCISES: CPT | Mod: GP | Performed by: PHYSICAL THERAPIST

## 2025-07-22 PROCEDURE — 97162 PT EVAL MOD COMPLEX 30 MIN: CPT | Mod: GP | Performed by: PHYSICAL THERAPIST

## 2025-07-22 ASSESSMENT — ENCOUNTER SYMPTOMS
PAIN SCALE: 0
PAIN SCALE AT HIGHEST: 5
PAIN SCALE AT LOWEST: 0
QUALITY: DULL ACHE
PAIN LOCATION: BACK PAIN

## 2025-07-22 NOTE — PROGRESS NOTES
Physical Therapy Evaluation and Treatment     Patient Name: Nika Ricardo  MRN: [id number]  Encounter date: 2025  Time Calculation  Start Time: 1215  Stop Time: 1305  Time Calculation (min): 50 min  PT Evaluation Time Entry  PT Evaluation (Moderate) Time Entry: 30  PT Therapeutic Procedures Time Entry  Therapeutic Exercise Time Entry: 20  Moderate complexity due to patient's clinical presentation being evolving with changing characteristics, with comorbidities/complexities to include Hypothyroidism, Hashimoto thyroiditis, HTN, generalized osteoarthritis, cerebral microvascular disease, lumbar radiculopathy, imbalance,, all of which may negatively impact rehab tolerance and progression.     Visit # 1 of 10  Visits/Dates Authorized: TS):NO AUTH/$257 DED (MET)/80% COVERAGE/ PT AMOUNT USED $0/ PER RTE - ANTH SUPP/100% COVERAGE/ FOLLOW MEDICARE GUIDELINES/ PER AVAILITY Transaction ID [id number]  Insurance Type: Payor: MEDICARE / Plan: MEDICARE PART A AND B / Product Type: *No Product type* /     Current Problem:   Problem List Items Addressed This Visit           ICD-10-CM    Imbalance - Primary R26.89    Relevant Orders    Follow Up In Physical Therapy     Precautions:  Precautions  STEADI Fall Risk Score (The score of 4 or more indicates an increased risk of falling): 7  Medical Precautions: Fall precautions  Precautions Comment: dizziness with quick turns  Past Medical History Relevant to Rehab: Hypothyroidism, Hashimoto thyroiditis, HTN, generalized osteoarthritis, cerebral microvascular disease, lumbar radiculopathy, imbalance,    Subjective    Subjective Evaluation    History of Present Illness  Date of onset: 2025  Mechanism of injury: Pt states that she get a weird feeling that causes her to feel less confident in her balance    Pain  Current pain ratin  At best pain ratin  At worst pain ratin  Location: back pain  Quality: dull ache (constant)    Social Support  Lives in: condominium (bed  and ballucia on main level)  Lives with: spouse    Hand dominance: right    Patient Goals  Patient goal: increase confidence with mobility         Objective      Objective    Saccades normal multiple directions  Fixed object with horizontal head turns and vertical head turns no nystagmus or dizziness reported    LE strength  5/5 grossly throughout B LE's   Cervical AROM  SB R 23 degrees  SB L 23 degrees  L rotation 50 degrees  R rotation 60 degrees  Pt with eye exam about one year ago    Outcome Measures:   TUG 14 sec N= 8 sec     FGA 15/30 --19/30        5 TSTS 13 sec N=13 sec    Treatments:   Therapeutic Activities:  Pt advised of findings from balance testing and role of PT.    Reviewed how to prevent falls in home and HO provided        Assessment   Assessment & Plan     Assessment  Prognosis: good    Goals  increase confidence with mobility    Plan  Therapy options: will be seen for skilled physical therapy services  Planned therapy interventions: abdominal trunk stabilization, balance/weight-bearing training, gait training, home exercise program, neuromuscular re-education, strengthening, stretching and therapeutic activities  Frequency: 2x week  Duration in visits: 10  Duration in weeks: 5  Treatment plan discussed with: patient      ,  Calvin Balance Next session    Goals:   Active       PT Problem       PT Goals       Start:  07/22/25    Expected End:  10/21/25       Balance Goals  Pt to report at least >80% confidence in her balance during ambulating in home and in community based on T ABC scale  Pt to score < 10 sec on TUG  in order to demonstrate decreased risk for falls .     Pt to score </= to 19/30 on FGA in order to demonstrate decreased risk for falling    Pt to be Independent and compliant with HEP in order to achieve goals           Patient Stated Goal 1       Start:  07/22/25    Expected End:  10/21/25       Pt would like to increase confidence with mobility

## 2025-07-23 PROBLEM — R26.89 IMBALANCE: Status: ACTIVE | Noted: 2025-07-23

## 2025-07-23 ASSESSMENT — ENCOUNTER SYMPTOMS
DEPRESSION: 0
LOSS OF SENSATION IN FEET: 0
OCCASIONAL FEELINGS OF UNSTEADINESS: 1

## 2025-08-04 ENCOUNTER — APPOINTMENT (OUTPATIENT)
Dept: DERMATOLOGY | Facility: CLINIC | Age: 80
End: 2025-08-04
Payer: MEDICARE

## 2025-08-04 DIAGNOSIS — L57.0 ACTINIC KERATOSIS: ICD-10-CM

## 2025-08-04 DIAGNOSIS — L71.9 ROSACEA: ICD-10-CM

## 2025-08-04 PROCEDURE — 1036F TOBACCO NON-USER: CPT | Performed by: NURSE PRACTITIONER

## 2025-08-04 PROCEDURE — 1159F MED LIST DOCD IN RCRD: CPT | Performed by: NURSE PRACTITIONER

## 2025-08-04 PROCEDURE — 1160F RVW MEDS BY RX/DR IN RCRD: CPT | Performed by: NURSE PRACTITIONER

## 2025-08-04 PROCEDURE — 99214 OFFICE O/P EST MOD 30 MIN: CPT | Performed by: NURSE PRACTITIONER

## 2025-08-04 RX ORDER — AZELAIC ACID 0.15 G/G
GEL TOPICAL
Qty: 50 G | Refills: 11 | Status: SHIPPED | OUTPATIENT
Start: 2025-08-04

## 2025-08-04 NOTE — Clinical Note
[employer] reported not to work. Will switch to Azelaic acid BID. Advised may cause skin irritation. Notify me if not tolerated.

## 2025-08-04 NOTE — PROGRESS NOTES
Nishant Riacrdo is a 79 y.o. female who presents for the following: Skin Check (Face ).     Review of Systems:  No other skin or systemic complaints other than what is documented elsewhere in the note.    The following portions of the chart were reviewed this encounter and updated as appropriate:   Tobacco  Allergies  Meds  Problems  Med Hx  Surg Hx         Skin Cancer History  Biopsy Log Book  No skin cancers from Specimen Tracking.    Additional History      Specialty Problems          Dermatology Problems    Epidermal cyst    Rosacea        Objective   Well appearing patient in no apparent distress; mood and affect are within normal limits.    A focused skin examination was performed neck up. All findings within normal limits unless otherwise noted below.    Assessment/Plan   Skin Exam  1. ACTINIC KERATOSIS  Left [place]  Thin erythematous papule with gritty scale  Patient wants to continue to monitor for now and will notify me if the lesion starts to change shape, size or color.   This Visit  - Follow Up In Dermatology - Established Patient  2. ROSACEA  Head - Anterior (Face)  Mid face with very faint erythema with telangiectasias and scattered inflammatory papules to the nose and left medial cheek area.   Metrocream reported not to work. Will switch to Azelaic acid BID. Advised may cause skin irritation. Notify me if not tolerated.   This Visit  - Follow Up In Dermatology - Established Patient  - azelaic acid (Finacea) 15 % gel - Apply to affected areas  twice daily  Existing Treatments  - metroNIDAZOLE (Metrocream) 0.75 % cream - Apply to affected areas twice daily    Return in November as planned for routine skin check or return to clinic sooner if needed

## 2025-08-04 NOTE — Clinical Note
Patient wants to continue to monitor for now and will notify me if the lesion starts to change shape, size or color.

## 2025-08-07 ENCOUNTER — TREATMENT (OUTPATIENT)
Dept: PHYSICAL THERAPY | Facility: CLINIC | Age: 80
End: 2025-08-07
Payer: MEDICARE

## 2025-08-07 DIAGNOSIS — R26.89 IMBALANCE: ICD-10-CM

## 2025-08-07 PROCEDURE — 97112 NEUROMUSCULAR REEDUCATION: CPT | Mod: GP | Performed by: PHYSICAL THERAPIST

## 2025-08-07 ASSESSMENT — BALANCE ASSESSMENTS
PLACE ALTERNATE FOOT ON STEP OR STOOL WHILE STANDING UNSUPPORTED: ABLE TO COMPLETE 4 STEPS WITHOUT AID WITH SUPERVISION
STANDING TO SITTING: ABLE TO STAND WITHOUT USING HANDS AND STABILIZE INDEPENDENTLY
STANDING UNSUPPORTED ONE FOOT IN FRONT: NEEDS HELP TO STEP BUT CAN HOLD 15 SECONDS
STANDING UNSUPPORTED WITH FEET TOGETHER: ABLE TO PLACE FEET TOGETHER INDEPENDENTLY AND STAND 1 MINUTE SAFELY
LONG VERSION TOTAL SCORE (MAX 56): 42
SITTING WITH BACK UNSUPPORTED BUT FEET SUPPORTED ON FLOOR OR ON A STOOL: ABLE TO SIT SAFELY AND SECURELY FOR 2 MINUTES
STANDING TO SITTING: SITS SAFELY WITH MINIMAL USE OF HANDS
REACHING FORWARD WITH OUTSTRETCHED ARM WHILE STANDING: CAN REACH FORWARD CONFIDENTLY 25 CM (10 INCHES)
STANDING ON ONE LEG: UNABLE TO TRY NEEDS ASSIST TO PREVENT FALL
PICK UP OBJECT FROM THE FLOOR FROM A STANDING POSITION: ABLE TO PICK UP SLIPPER SAFELY AND EASILY
PLACE ALTERNATE FOOT ON STEP OR STOOL WHILE STANDING UNSUPPORTED: TURNS SIDEWAYS ONLY BUT MAINTAINS BALANCE
TRANSFERS: ABLE TO TRANSFER SAFELY WITH MINOR USE OF HANDS
STANDING UNSUPPORTED WITH EYES CLOSED: ABLE TO STAND 10 SECONDS SAFELY
STANDING UNSUPPORTED: ABLE TO STAND 2 MINUTES WITH SUPERVISION
TURN 360 DEGREES: ABLE TO TURN 360 DEGREES SAFELY BUT SLOWLY

## 2025-08-07 NOTE — PROGRESS NOTES
Physical Therapy Treatment    Patient Name: Nika Ricardo  MRN: [id number]  YOB: 1945  Encounter Date: 8/7/2025    Time Entry:  Time Calculation  Start Time: 1515  Stop Time: 1600  Time Calculation (min): 45 min     PT Therapeutic Procedures Time Entry  Neuromuscular Re-Education Time Entry: 45                   Rehab Insurance Information:   Visit Count: 2  POC Visits: 10  Auth Required: No  Medicare cert. dates: 07/22/25  Through: 10/21/25     Additional Authorization/Insurance Information: TS):NO AUTH/$257 DED (MET)/80% COVERAGE/ PT AMOUNT USED $0/ PER RTE - ANTH SUPP/100% COVERAGE/ FOLLOW MEDICARE GUIDELINES/ PER [facility] Transaction ID [id number]    Rehab Falls Risk Assessment:  Fall Risk Indicated: Yes  Factors for Low Risk for Falls: Age 65 or older  Factors for Moderate Risk For Falls: Unsteady gait/use of assistive devices/apparent weakness or functionally challenged, Current dizziness/syncope, Fear of falling, Fall within last 6 months  Moderate Fall Risk Interventions: Fall prevention education provided (neuromuscular education activites)      Problem List Items Addressed This Visit           ICD-10-CM    Imbalance R26.89       Precautions       Additional Precautions and Protocol Details: Hypothyroidism, Hashimoto thyroiditis, HTN, generalized osteoarthritis, cerebral microvascular disease, lumbar radiculopathy, imbalance,    Subjective   Pt states that she is having some dizziness when rising from bed in am. Pt reports no falls, some unsteadiness.  Pain reported as 1. back         Objective       Pt moves with caution sit to stand and during ambulation.  Pt avoiding turning her head in fear of causing dizziness     Calvin Balance  Calvin Balance Score: 43  Calvin Balance Fall Risk: Low    Interpretation:  41-56 = Low Fall Risk  21-40 = Medium Fall Risk  0-20 = High Fall Risk         Activities   Therapeutic Exercise  Therapeutic Exercise Performed: Yes  Therapeutic Exercise Activity 1: Nustep  "L3 for 6 min with discussion of current status  Therapeutic Exercise Activity 2: HC stretch on incline x3 20 ct hold  Therapeutic Exercise Activity 3: HR/TR 2x10 with light support  Therapeutic Exercise Activity 4: SERVIN BALANCE TEST  Therapeutic Exercise Activity 5: SLS x3 15 ct hold R and L with light support  Therapeutic Exercise Activity 6: alt taps on 6\" step without support 2 x10  Therapeutic Exercise Activity 7: reviewed HEP and issued HO's                                                Access Code: [email or web address]  URL: [email or web address]  Date: 08/07/2025  Prepared by: Carol Marsh     Exercises  - Single Leg Stance with Support  - 1 x daily - 7 x weekly - 1 sets - 3 reps - 15-30 hold  - Standing Ankle Plantar Flexion Dorsiflexion on Rocker Board with Counter Support  - 1 x daily - 7 x weekly - 3 sets - 10 reps - 3 hold  -standing alternating taps on step 3 x10 with support as needed            Assessment/Plan   Assessment: Pt with some dizzness when looking down and looking up, but dizziness resolves quickly.  Pt with a score of 43/56 on Servin Balance Scale indicating a fall risk.  Pt with good under standing of balance exercises issued today. Patient tolerated treatment well      Plan: Nustep, HS stretch, standing feet together with V and H head movements, airex balance      Active       PT Goals       Start:  07/22/25    Expected End:  10/21/25       Balance Goals  Pt to report at least >80% confidence in her balance during ambulating in home and in community based on T ABC scale  Pt to score < 10 sec on TUG  in order to demonstrate decreased risk for falls .     Pt to score </= to 19/30 on FGA in order to demonstrate decreased risk for falling    Pt to be Independent and compliant with HEP in order to achieve goals           Patient Stated Goal 1       Start:  07/22/25    Expected End:  10/21/25       Pt would like to increase confidence with mobility         "

## 2025-08-07 NOTE — PATIENT INSTRUCTIONS
Access Code: [email or web address]  URL: [email or web address]  Date: 08/07/2025  Prepared by: Carol Marsh    Exercises  - Single Leg Stance with Support  - 1 x daily - 7 x weekly - 1 sets - 3 reps - 15-30 hold  - Standing Ankle Plantar Flexion Dorsiflexion on Rocker Board with Counter Support  - 1 x daily - 7 x weekly - 3 sets - 10 reps - 3 hold  -standing alternating taps on step 3 x10 with support as needed  
No

## 2025-08-11 ENCOUNTER — TREATMENT (OUTPATIENT)
Dept: PHYSICAL THERAPY | Facility: CLINIC | Age: 80
End: 2025-08-11
Payer: MEDICARE

## 2025-08-11 DIAGNOSIS — R26.89 IMBALANCE: ICD-10-CM

## 2025-08-11 PROCEDURE — 97110 THERAPEUTIC EXERCISES: CPT | Mod: GP | Performed by: PHYSICAL THERAPIST

## 2025-08-14 ENCOUNTER — DOCUMENTATION (OUTPATIENT)
Dept: PHYSICAL THERAPY | Facility: CLINIC | Age: 80
End: 2025-08-14
Payer: MEDICARE

## 2025-08-14 DIAGNOSIS — R26.89 IMBALANCE: ICD-10-CM

## 2025-08-18 ENCOUNTER — TREATMENT (OUTPATIENT)
Dept: PHYSICAL THERAPY | Facility: CLINIC | Age: 80
End: 2025-08-18
Payer: MEDICARE

## 2025-08-18 DIAGNOSIS — R26.89 IMBALANCE: ICD-10-CM

## 2025-08-18 PROCEDURE — 97110 THERAPEUTIC EXERCISES: CPT | Mod: GP | Performed by: PHYSICAL THERAPIST

## 2025-08-25 ENCOUNTER — TREATMENT (OUTPATIENT)
Dept: PHYSICAL THERAPY | Facility: CLINIC | Age: 80
End: 2025-08-25
Payer: MEDICARE

## 2025-08-25 DIAGNOSIS — R26.89 IMBALANCE: ICD-10-CM

## 2025-08-25 DIAGNOSIS — Z12.31 ENCOUNTER FOR SCREENING MAMMOGRAM FOR MALIGNANT NEOPLASM OF BREAST: ICD-10-CM

## 2025-08-25 PROCEDURE — 97112 NEUROMUSCULAR REEDUCATION: CPT | Mod: GP,CQ

## 2025-08-27 ENCOUNTER — HOSPITAL ENCOUNTER (OUTPATIENT)
Dept: RADIOLOGY | Facility: HOSPITAL | Age: 80
Discharge: HOME | End: 2025-08-27
Payer: MEDICARE

## 2025-08-27 VITALS — WEIGHT: 176 LBS | BODY MASS INDEX: 32.39 KG/M2 | HEIGHT: 62 IN

## 2025-08-27 DIAGNOSIS — Z12.31 ENCOUNTER FOR SCREENING MAMMOGRAM FOR MALIGNANT NEOPLASM OF BREAST: ICD-10-CM

## 2025-08-27 PROCEDURE — 77067 SCR MAMMO BI INCL CAD: CPT

## 2025-08-27 PROCEDURE — 77067 SCR MAMMO BI INCL CAD: CPT | Performed by: RADIOLOGY

## 2025-08-27 PROCEDURE — 77063 BREAST TOMOSYNTHESIS BI: CPT | Performed by: RADIOLOGY

## 2025-08-28 ENCOUNTER — TREATMENT (OUTPATIENT)
Dept: PHYSICAL THERAPY | Facility: CLINIC | Age: 80
End: 2025-08-28
Payer: MEDICARE

## 2025-08-28 DIAGNOSIS — R26.89 IMBALANCE: ICD-10-CM

## 2025-08-28 PROCEDURE — 97110 THERAPEUTIC EXERCISES: CPT | Mod: GP | Performed by: PHYSICAL THERAPIST

## 2025-08-28 ASSESSMENT — BALANCE ASSESSMENTS
TRANSFERS: ABLE TO TRANSFER SAFELY WITH MINOR USE OF HANDS
SITTING WITH BACK UNSUPPORTED BUT FEET SUPPORTED ON FLOOR OR ON A STOOL: ABLE TO SIT SAFELY AND SECURELY FOR 2 MINUTES
PICK UP OBJECT FROM THE FLOOR FROM A STANDING POSITION: ABLE TO PICK UP SLIPPER SAFELY AND EASILY
STANDING TO SITTING: SITS SAFELY WITH MINIMAL USE OF HANDS
STANDING TO SITTING: ABLE TO STAND WITHOUT USING HANDS AND STABILIZE INDEPENDENTLY
PLACE ALTERNATE FOOT ON STEP OR STOOL WHILE STANDING UNSUPPORTED: LOOKS BEHIND ONE SIDE ONLY OTHER SIDE SHOWS LESS WEIGHT SHIFT
STANDING UNSUPPORTED ONE FOOT IN FRONT: ABLE TO TAKE SMALL STEP INDEPENDENTLY AND HOLD 30 SECONDS
TURN 360 DEGREES: ABLE TO TURN 360 DEGREES SAFELY BUT SLOWLY
STANDING ON ONE LEG: TRIES TO LIFT LEG UNABLE TO HOLD 3 SECONDS BUT REMAINS STANDING INDEPENDENTLY
STANDING UNSUPPORTED WITH EYES CLOSED: ABLE TO STAND 10 SECONDS SAFELY
STANDING UNSUPPORTED: ABLE TO STAND SAFELY FOR 2 MINUTES
STANDING UNSUPPORTED WITH FEET TOGETHER: ABLE TO PLACE FEET TOGETHER INDEPENDENTLY AND STAND 1 MINUTE SAFELY
LONG VERSION TOTAL SCORE (MAX 56): 48
REACHING FORWARD WITH OUTSTRETCHED ARM WHILE STANDING: CAN REACH FORWARD CONFIDENTLY 25 CM (10 INCHES)
PLACE ALTERNATE FOOT ON STEP OR STOOL WHILE STANDING UNSUPPORTED: ABLE TO STAND INDEPENDENTLY AND SAFELY AND COMPLETE 8 STEPS IN 20 SECONDS

## 2025-08-29 DIAGNOSIS — Z12.31 ENCOUNTER FOR SCREENING MAMMOGRAM FOR MALIGNANT NEOPLASM OF BREAST: ICD-10-CM

## 2025-11-12 ENCOUNTER — APPOINTMENT (OUTPATIENT)
Dept: DERMATOLOGY | Facility: CLINIC | Age: 80
End: 2025-11-12
Payer: MEDICARE

## 2025-11-18 ENCOUNTER — APPOINTMENT (OUTPATIENT)
Dept: DERMATOLOGY | Facility: CLINIC | Age: 80
End: 2025-11-18
Payer: MEDICARE